# Patient Record
Sex: FEMALE | Race: WHITE | NOT HISPANIC OR LATINO | Employment: PART TIME | URBAN - METROPOLITAN AREA
[De-identification: names, ages, dates, MRNs, and addresses within clinical notes are randomized per-mention and may not be internally consistent; named-entity substitution may affect disease eponyms.]

---

## 2017-04-03 ENCOUNTER — ALLSCRIPTS OFFICE VISIT (OUTPATIENT)
Dept: OTHER | Facility: OTHER | Age: 57
End: 2017-04-03

## 2017-05-01 ENCOUNTER — TRANSCRIBE ORDERS (OUTPATIENT)
Dept: LAB | Facility: HOSPITAL | Age: 57
End: 2017-05-01

## 2017-05-01 ENCOUNTER — APPOINTMENT (OUTPATIENT)
Dept: LAB | Facility: HOSPITAL | Age: 57
End: 2017-05-01
Attending: INTERNAL MEDICINE
Payer: COMMERCIAL

## 2017-05-01 DIAGNOSIS — K58.9 IRRITABLE BOWEL SYNDROME, UNSPECIFIED TYPE: Primary | ICD-10-CM

## 2017-05-01 DIAGNOSIS — K58.9 IRRITABLE BOWEL SYNDROME, UNSPECIFIED TYPE: ICD-10-CM

## 2017-05-01 LAB
ALBUMIN SERPL BCP-MCNC: 4 G/DL (ref 3.5–5)
ALP SERPL-CCNC: 110 U/L (ref 46–116)
ALT SERPL W P-5'-P-CCNC: 35 U/L (ref 12–78)
ANION GAP SERPL CALCULATED.3IONS-SCNC: 5 MMOL/L (ref 4–13)
AST SERPL W P-5'-P-CCNC: 18 U/L (ref 5–45)
BILIRUB SERPL-MCNC: 0.35 MG/DL (ref 0.2–1)
BUN SERPL-MCNC: 18 MG/DL (ref 5–25)
CALCIUM SERPL-MCNC: 9.2 MG/DL (ref 8.3–10.1)
CHLORIDE SERPL-SCNC: 106 MMOL/L (ref 100–108)
CO2 SERPL-SCNC: 31 MMOL/L (ref 21–32)
CREAT SERPL-MCNC: 0.64 MG/DL (ref 0.6–1.3)
GFR SERPL CREATININE-BSD FRML MDRD: >60 ML/MIN/1.73SQ M
GLUCOSE SERPL-MCNC: 90 MG/DL (ref 65–140)
POTASSIUM SERPL-SCNC: 4.3 MMOL/L (ref 3.5–5.3)
PROT SERPL-MCNC: 7.4 G/DL (ref 6.4–8.2)
SODIUM SERPL-SCNC: 142 MMOL/L (ref 136–145)

## 2017-05-01 PROCEDURE — 36415 COLL VENOUS BLD VENIPUNCTURE: CPT

## 2017-05-01 PROCEDURE — 80053 COMPREHEN METABOLIC PANEL: CPT

## 2017-12-11 ENCOUNTER — APPOINTMENT (OUTPATIENT)
Dept: LAB | Facility: CLINIC | Age: 57
End: 2017-12-11
Payer: COMMERCIAL

## 2017-12-11 ENCOUNTER — TRANSCRIBE ORDERS (OUTPATIENT)
Dept: LAB | Facility: CLINIC | Age: 57
End: 2017-12-11

## 2017-12-11 DIAGNOSIS — Z13.9 SCREENING FOR CONDITION: ICD-10-CM

## 2017-12-11 DIAGNOSIS — E55.9 AVITAMINOSIS D: Primary | ICD-10-CM

## 2017-12-11 LAB
25(OH)D3 SERPL-MCNC: 31.5 NG/ML (ref 30–100)
ALBUMIN SERPL BCP-MCNC: 3.7 G/DL (ref 3.5–5)
ALP SERPL-CCNC: 105 U/L (ref 46–116)
ALT SERPL W P-5'-P-CCNC: 33 U/L (ref 12–78)
ANION GAP SERPL CALCULATED.3IONS-SCNC: 4 MMOL/L (ref 4–13)
AST SERPL W P-5'-P-CCNC: 20 U/L (ref 5–45)
BILIRUB SERPL-MCNC: 0.43 MG/DL (ref 0.2–1)
BUN SERPL-MCNC: 20 MG/DL (ref 5–25)
CALCIUM SERPL-MCNC: 9.2 MG/DL (ref 8.3–10.1)
CHLORIDE SERPL-SCNC: 107 MMOL/L (ref 100–108)
CHOLEST SERPL-MCNC: 220 MG/DL (ref 50–200)
CO2 SERPL-SCNC: 30 MMOL/L (ref 21–32)
CREAT SERPL-MCNC: 0.69 MG/DL (ref 0.6–1.3)
ERYTHROCYTE [DISTWIDTH] IN BLOOD BY AUTOMATED COUNT: 12.7 % (ref 11.6–15.1)
GFR SERPL CREATININE-BSD FRML MDRD: 98 ML/MIN/1.73SQ M
GLUCOSE P FAST SERPL-MCNC: 92 MG/DL (ref 65–99)
HCT VFR BLD AUTO: 44.9 % (ref 34.8–46.1)
HDLC SERPL-MCNC: 76 MG/DL (ref 40–60)
HGB BLD-MCNC: 14.9 G/DL (ref 11.5–15.4)
LDLC SERPL CALC-MCNC: 122 MG/DL (ref 0–100)
MCH RBC QN AUTO: 28.9 PG (ref 26.8–34.3)
MCHC RBC AUTO-ENTMCNC: 33.2 G/DL (ref 31.4–37.4)
MCV RBC AUTO: 87 FL (ref 82–98)
PLATELET # BLD AUTO: 233 THOUSANDS/UL (ref 149–390)
PMV BLD AUTO: 10.8 FL (ref 8.9–12.7)
POTASSIUM SERPL-SCNC: 4.4 MMOL/L (ref 3.5–5.3)
PROT SERPL-MCNC: 7.2 G/DL (ref 6.4–8.2)
RBC # BLD AUTO: 5.15 MILLION/UL (ref 3.81–5.12)
SODIUM SERPL-SCNC: 141 MMOL/L (ref 136–145)
TRIGL SERPL-MCNC: 110 MG/DL
WBC # BLD AUTO: 4.59 THOUSAND/UL (ref 4.31–10.16)

## 2017-12-11 PROCEDURE — 80061 LIPID PANEL: CPT

## 2017-12-11 PROCEDURE — 82306 VITAMIN D 25 HYDROXY: CPT

## 2017-12-11 PROCEDURE — 85027 COMPLETE CBC AUTOMATED: CPT

## 2017-12-11 PROCEDURE — 80053 COMPREHEN METABOLIC PANEL: CPT

## 2017-12-11 PROCEDURE — 36415 COLL VENOUS BLD VENIPUNCTURE: CPT

## 2017-12-30 DIAGNOSIS — Z12.31 ENCOUNTER FOR SCREENING MAMMOGRAM FOR MALIGNANT NEOPLASM OF BREAST: ICD-10-CM

## 2018-01-13 VITALS
DIASTOLIC BLOOD PRESSURE: 76 MMHG | WEIGHT: 125 LBS | HEIGHT: 68 IN | SYSTOLIC BLOOD PRESSURE: 112 MMHG | BODY MASS INDEX: 18.94 KG/M2

## 2018-03-29 ENCOUNTER — HOSPITAL ENCOUNTER (OUTPATIENT)
Dept: RADIOLOGY | Age: 58
Discharge: HOME | End: 2018-03-29
Payer: COMMERCIAL

## 2018-03-29 DIAGNOSIS — Z12.31 ENCOUNTER FOR SCREENING MAMMOGRAM FOR MALIGNANT NEOPLASM OF BREAST: ICD-10-CM

## 2018-03-29 PROCEDURE — 77063 BREAST TOMOSYNTHESIS BI: CPT

## 2018-03-29 PROCEDURE — 77067 SCR MAMMO BI INCL CAD: CPT

## 2018-04-16 ENCOUNTER — ANNUAL EXAM (OUTPATIENT)
Dept: OBGYN CLINIC | Facility: CLINIC | Age: 58
End: 2018-04-16
Payer: COMMERCIAL

## 2018-04-16 VITALS
DIASTOLIC BLOOD PRESSURE: 66 MMHG | BODY MASS INDEX: 20.75 KG/M2 | WEIGHT: 132.2 LBS | HEIGHT: 67 IN | SYSTOLIC BLOOD PRESSURE: 110 MMHG

## 2018-04-16 DIAGNOSIS — Z12.39 BREAST CANCER SCREENING: ICD-10-CM

## 2018-04-16 DIAGNOSIS — Z01.419 WOMEN'S ANNUAL ROUTINE GYNECOLOGICAL EXAMINATION: Primary | ICD-10-CM

## 2018-04-16 PROCEDURE — S0612 ANNUAL GYNECOLOGICAL EXAMINA: HCPCS | Performed by: OBSTETRICS & GYNECOLOGY

## 2018-04-16 RX ORDER — DICYCLOMINE HYDROCHLORIDE 10 MG/1
CAPSULE ORAL
COMMUNITY
Start: 2018-04-04 | End: 2018-09-27 | Stop reason: SDUPTHER

## 2018-04-16 NOTE — PROGRESS NOTES
This is a 53-year-old white female, she is a  4 para 3 prior vaginal deliveries  She is now in menopause  She does have history of irritable bowel syndrome which is under care physician  She has taken Bentyl for treatment  She denies any other serious medical problem this time  Menopause symptoms are under control except for vaginal dryness  This is getting better  She denies any major  GI complaint  Colonoscopies are up-to-date  There are no new major family illnesses report this time  Patient denies any problem with anxiety or depression  Review of systems noncontributory      Surgical history is negative      Medical problems consistent with IBS      Family history noncontributory      Social history negative tobacco positive social alcohol      Physical exam well-developed well-nourished thin white female acute distress heart and lung exam normal limits the breast exam normal limits the abdomen is soft there are no masses pelvic exam the external genitalia normal limits vagina is clean the uterus is mid position normal size is no cervical motion tenderness adnexa clear bilaterally  A Pap smear was not performed because of prior history being HPV negative in the year 2016  Impression stable menopausal exam   Continue getting yearly mammograms  The patient already taking calcium and vitamin-D  She return to office in 1 year

## 2018-04-16 NOTE — PATIENT INSTRUCTIONS
The patient was informed of a stable gyn examination  No Pap smears performed because of prior history being HPV negative in the year 2016  She should continue to get yearly mammograms  She should continue to return my office in 1 year

## 2018-08-22 ENCOUNTER — TELEPHONE (OUTPATIENT)
Dept: GASTROENTEROLOGY | Facility: MEDICAL CENTER | Age: 58
End: 2018-08-22

## 2018-08-22 DIAGNOSIS — K52.9 INFLAMMATORY BOWEL DISEASE: Primary | ICD-10-CM

## 2018-08-22 NOTE — TELEPHONE ENCOUNTER
----- Message from Melly Us DO sent at 8/22/2018 10:29 AM EDT -----  Please offer her earlier appt if she would like  ----- Message -----  From: Dawood Smith  Sent: 8/22/2018  10:05 AM  To: Melly Us DO    Good morning Dr Abner Louise,    Call center already scheduled this patient with you on 11/6/2018 at 10am at the [facility] office  Do you want to us to offer her an earlier appt or leave it the way it is? Thank you   Malina      ----- Message -----  From: Melly Us DO  Sent: 8/22/2018   9:23 AM  To: [facility]    Please schedule pt new pt appt with me  Ok to [employer] she was referred by a cardiologist who works with me      Thanks,  Beverly Kaufman

## 2018-09-12 ENCOUNTER — OFFICE VISIT (OUTPATIENT)
Dept: GASTROENTEROLOGY | Facility: CLINIC | Age: 58
End: 2018-09-12
Payer: COMMERCIAL

## 2018-09-12 VITALS
WEIGHT: 129.6 LBS | HEART RATE: 53 BPM | BODY MASS INDEX: 20.3 KG/M2 | SYSTOLIC BLOOD PRESSURE: 124 MMHG | DIASTOLIC BLOOD PRESSURE: 66 MMHG | TEMPERATURE: 98.4 F

## 2018-09-12 DIAGNOSIS — K58.2 IRRITABLE BOWEL SYNDROME WITH BOTH CONSTIPATION AND DIARRHEA: ICD-10-CM

## 2018-09-12 DIAGNOSIS — Z12.11 SCREENING FOR COLON CANCER: Primary | ICD-10-CM

## 2018-09-12 PROCEDURE — 99244 OFF/OP CNSLTJ NEW/EST MOD 40: CPT | Performed by: INTERNAL MEDICINE

## 2018-09-12 RX ORDER — LOPERAMIDE HYDROCHLORIDE 2 MG/1
2 TABLET ORAL 4 TIMES DAILY PRN
Qty: 120 TABLET | Refills: 5 | Status: SHIPPED | OUTPATIENT
Start: 2018-09-12 | End: 2020-03-20 | Stop reason: SDUPTHER

## 2018-09-12 RX ORDER — PEG-3350, SODIUM SULFATE, SODIUM CHLORIDE, POTASSIUM CHLORIDE, SODIUM ASCORBATE AND ASCORBIC ACID 7.5-2.691G
2000 KIT ORAL ONCE
Qty: 2000 ML | Refills: 0 | Status: SHIPPED | OUTPATIENT
Start: 2018-09-12 | End: 2019-05-22

## 2018-09-12 NOTE — PROGRESS NOTES
[facility] - Outpatient Consultation  Zahra Grewal 66 y o  female MRN: [id number]  Encounter: [id number]          ASSESSMENT AND PLAN:      1  Irritable bowel syndrome  Long standing IBS, patient follows with Dr Frnak Chopra as OP but wanted a second opinion, has tried low FODMAP and gluten free diet with mild improvement, bentyl helps with pain, has tried imodium to help with diarrhea but is concerned about getting constipated  Currently the patient having 3-6 BM a day, alternating between decreased consistency and normal consistency, no weight loss  Will start imodium at the beginning of the day along with xifaxan for 2 weeks  Will have a f/u appointment in 6 months  - Ambulatory referral to Gastroenterology    2  Colorectal cancer screening  - Patient is 66 y o , has been screened for colon cancer in the past,last colonoscopy was 5 years ago with Dr Frank Chopra, denies any family history of GI malignancy or IBD, no alarm symptoms at this point except for abnormal bowel movements  - Other options for colorectal cancer screening were discussed with the patient, will perform colonoscopy, risk and benefits of the procedure were discussed with the patient including but not limited to bleeding, infection, perforation and missing an adenoma   ______________________________________________________________________    HPI:  67 yo female patient with no significant past medical history except for chronic IBS, patient states she suffers from IBS since she was young, she complains about abdominal pain in the meso and hypogastrium, she has associated bloating and mostly diarrhea, associated to diarrhea she has had episodes of fecal incontinence, she has tried imodium in the past but she gets constipated after using imodium, bentyl usually helps with pain but not with diarrhea  Patient had 3 vaginal deliveries in the past and had episiotomy during all procedures   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      REVIEW OF SYSTEMS:    CONSTITUTIONAL: Denies any fever, chills, rigors, and weight loss  HEENT: No earache or tinnitus  Denies hearing loss or visual disturbances  CARDIOVASCULAR: No chest pain or palpitations  RESPIRATORY: Denies any cough, hemoptysis, shortness of breath or dyspnea on exertion  GASTROINTESTINAL: As noted in the History of Present Illness  GENITOURINARY: No problems with urination  Denies any hematuria or dysuria  NEUROLOGIC: No dizziness or vertigo, denies headaches  MUSCULOSKELETAL: Denies any muscle or joint pain  SKIN: Denies skin rashes or itching  ENDOCRINE: Denies excessive thirst  Denies intolerance to heat or cold  PSYCHOSOCIAL: Denies depression or anxiety  Denies any recent memory loss  Historical Information   History reviewed  No pertinent past medical history  Past Surgical History:   Procedure Laterality Date    COLONOSCOPY      every 5 years    UPPER GASTROINTESTINAL ENDOSCOPY       Social History   History   Alcohol Use    Yes     Comment: occasional     History   Drug Use No     History   Smoking Status    Never Smoker   Smokeless Tobacco    Never Used     Family History   Problem Relation Age of Onset    Heart disease Father     Other Father         Myocardial Ischemia    Coronary artery disease Family     Emphysema Other     Heart attack Other     Heart disease Other     Hyperlipidemia Other        Meds/Allergies       Current Outpatient Prescriptions:     dicyclomine (BENTYL) 10 mg capsule    Allergies   Allergen Reactions    Eggs Or Egg-Derived Products     Erythromycin     Gluten Meal     Milk-Related Compounds            Objective     Blood pressure 124/66, pulse (!) 53, temperature 98 4 °F (36 9 °C), temperature source Tympanic, weight 58 8 kg (129 lb 9 6 oz)  Body mass index is 20 3 kg/m²          PHYSICAL EXAM:      General Appearance:   Alert, cooperative, no distress   HEENT:   Normocephalic, atraumatic, anicteric      Neck:  Supple, symmetrical, trachea midline   Lungs:   Clear to auscultation bilaterally; no rales, rhonchi or wheezing; respirations unlabored    Heart[de-identified]   Regular rate and rhythm; no murmur, rub, or gallop  Abdomen:   Soft, non-tender, non-distended; normal bowel sounds; no masses, no organomegaly    Genitalia:   Deferred    Rectal:   Deferred    Extremities:  No cyanosis, clubbing or edema    Pulses:  2+ and symmetric    Skin:  No jaundice, rashes, or lesions    Lymph nodes:  No palpable cervical lymphadenopathy        Lab Results:   No visits with results within 1 Day(s) from this visit  Latest known visit with results is:   Transcribe Orders on 12/11/2017   Component Date Value    WBC 12/11/2017 4 59     RBC 12/11/2017 5 15*    Hemoglobin 12/11/2017 14 9     Hematocrit 12/11/2017 44 9     MCV 12/11/2017 87     MCH 12/11/2017 28 9     MCHC 12/11/2017 33 2     RDW 12/11/2017 12 7     Platelets 38/60/9708 233     MPV 12/11/2017 10 8     Sodium 12/11/2017 141     Potassium 12/11/2017 4 4     Chloride 12/11/2017 107     CO2 12/11/2017 30     ANION GAP 12/11/2017 4     BUN 12/11/2017 20     Creatinine 12/11/2017 0 69     Glucose, Fasting 12/11/2017 92     Calcium 12/11/2017 9 2     AST 12/11/2017 20     ALT 12/11/2017 33     Alkaline Phosphatase 12/11/2017 105     Total Protein 12/11/2017 7 2     Albumin 12/11/2017 3 7     Total Bilirubin 12/11/2017 0 43     eGFR 12/11/2017 98     Cholesterol 12/11/2017 220*    Triglycerides 12/11/2017 110     HDL, Direct 12/11/2017 76*    LDL Calculated 12/11/2017 122*    Vit D, 25-Hydroxy 12/11/2017 31 5          Radiology Results:   No results found

## 2018-09-12 NOTE — LETTER
September 12, 2018     [place] [address]    Patient: Fernie Rome   YOB: 1960   Date of Visit: 9/12/2018       Dear Dr Dinesh Espinosa: Thank you for referring Karen Ramirez to me for evaluation  Below are my notes for this consultation  If you have questions, please do not hesitate to call me  I look forward to following your patient along with you  Sincerely,        Kaley Pod, DO        CC: No Recipients  Miranda Farrar MD  9/12/2018 12:28 PM  [address] [facility] - Outpatient Consultation  Fernie Rome 66 y o  female MRN: [id number]  Encounter: [id number]          ASSESSMENT AND PLAN:      1  Irritable bowel syndrome  Long standing IBS, patient follows with Dr Candelaria Lindquist as OP but wanted a second opinion, has tried low FODMAP and gluten free diet with mild improvement, bentyl helps with pain, has tried imodium to help with diarrhea but is concerned about getting constipated  Currently the patient having 3-6 BM a day, alternating between decreased consistency and normal consistency, no weight loss  Will start imodium at the beginning of the day along with xifaxan for 2 weeks  Will have a f/u appointment in 6 months  - Ambulatory referral to Gastroenterology    2  Colorectal cancer screening  - Patient is 66 y o , has been screened for colon cancer in the past,last colonoscopy was 5 years ago with Dr Candelaria Lindquist, denies any family history of GI malignancy or IBD, no alarm symptoms at this point except for abnormal bowel movements    - Other options for colorectal cancer screening were discussed with the patient, will perform colonoscopy, risk and benefits of the procedure were discussed with the patient including but not limited to bleeding, infection, perforation and missing an adenoma   ______________________________________________________________________    HPI:  65 yo female patient with no significant past medical history except for chronic IBS, patient states she suffers from IBS since she was young, she complains about abdominal pain in the meso and hypogastrium, she has associated bloating and mostly diarrhea, associated to diarrhea she has had episodes of fecal incontinence, she has tried imodium in the past but she gets constipated after using imodium, bentyl usually helps with pain but not with diarrhea  Patient had 3 vaginal deliveries in the past and had episiotomy during all procedures  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      REVIEW OF SYSTEMS:    CONSTITUTIONAL: Denies any fever, chills, rigors, and weight loss  HEENT: No earache or tinnitus  Denies hearing loss or visual disturbances  CARDIOVASCULAR: No chest pain or palpitations  RESPIRATORY: Denies any cough, hemoptysis, shortness of breath or dyspnea on exertion  GASTROINTESTINAL: As noted in the History of Present Illness  GENITOURINARY: No problems with urination  Denies any hematuria or dysuria  NEUROLOGIC: No dizziness or vertigo, denies headaches  MUSCULOSKELETAL: Denies any muscle or joint pain  SKIN: Denies skin rashes or itching  ENDOCRINE: Denies excessive thirst  Denies intolerance to heat or cold  PSYCHOSOCIAL: Denies depression or anxiety  Denies any recent memory loss  Historical Information   History reviewed  No pertinent past medical history    Past Surgical History:   Procedure Laterality Date    COLONOSCOPY      every 5 years    UPPER GASTROINTESTINAL ENDOSCOPY       Social History   History   Alcohol Use    Yes     Comment: occasional     History   Drug Use No     History   Smoking Status    Never Smoker   Smokeless Tobacco    Never Used     Family History   Problem Relation Age of Onset    Heart disease Father     Other Father         Myocardial Ischemia    Coronary artery disease Family     Emphysema Other     Heart attack Other     Heart disease Other     Hyperlipidemia Other        Meds/Allergies       Current Outpatient Prescriptions:     dicyclomine (BENTYL) 10 mg capsule    Allergies   Allergen Reactions    Eggs Or Egg-Derived Products     Erythromycin     Gluten Meal     Milk-Related Compounds            Objective     Blood pressure 124/66, pulse (!) 53, temperature 98 4 °F (36 9 °C), temperature source Tympanic, weight 58 8 kg (129 lb 9 6 oz)  Body mass index is 20 3 kg/m²  PHYSICAL EXAM:      General Appearance:   Alert, cooperative, no distress   HEENT:   Normocephalic, atraumatic, anicteric      Neck:  Supple, symmetrical, trachea midline   Lungs:   Clear to auscultation bilaterally; no rales, rhonchi or wheezing; respirations unlabored    Heart[de-identified]   Regular rate and rhythm; no murmur, rub, or gallop  Abdomen:   Soft, non-tender, non-distended; normal bowel sounds; no masses, no organomegaly    Genitalia:   Deferred    Rectal:   Deferred    Extremities:  No cyanosis, clubbing or edema    Pulses:  2+ and symmetric    Skin:  No jaundice, rashes, or lesions    Lymph nodes:  No palpable cervical lymphadenopathy        Lab Results:   No visits with results within 1 Day(s) from this visit     Latest known visit with results is:   Transcribe Orders on 12/11/2017   Component Date Value    WBC 12/11/2017 4 59     RBC 12/11/2017 5 15*    Hemoglobin 12/11/2017 14 9     Hematocrit 12/11/2017 44 9     MCV 12/11/2017 87     MCH 12/11/2017 28 9     MCHC 12/11/2017 33 2     RDW 12/11/2017 12 7     Platelets 81/43/1202 233     MPV 12/11/2017 10 8     Sodium 12/11/2017 141     Potassium 12/11/2017 4 4     Chloride 12/11/2017 107     CO2 12/11/2017 30     ANION GAP 12/11/2017 4     BUN 12/11/2017 20     Creatinine 12/11/2017 0 69     Glucose, Fasting 12/11/2017 92     Calcium 12/11/2017 9 2     AST 12/11/2017 20     ALT 12/11/2017 33     Alkaline Phosphatase 12/11/2017 105     Total Protein 12/11/2017 7 2     Albumin 12/11/2017 3 7     Total Bilirubin 12/11/2017 0 43     eGFR 12/11/2017 98     Cholesterol 12/11/2017 220*    Triglycerides 12/11/2017 110     HDL, Direct 12/11/2017 76*    LDL Calculated 12/11/2017 122*    Vit D, 25-Hydroxy 12/11/2017 31 5          Radiology Results:   No results found

## 2018-09-12 NOTE — PATIENT INSTRUCTIONS
COLON scheduled with Dr Velez at [facility] on 10/18/2018  Bandar gave pt verbal instructions/paper work given  Prep Moviprep

## 2018-09-18 ENCOUNTER — TELEPHONE (OUTPATIENT)
Dept: GASTROENTEROLOGY | Facility: AMBULARY SURGERY CENTER | Age: 58
End: 2018-09-18

## 2018-09-18 NOTE — TELEPHONE ENCOUNTER
I spoke to Laurita Kerr and explained that Abi Hand is a 2 week course  Patients have relief anywhere from 6 weeks to 6 months  If she has good results with Xifaxan we can repeat and other course in the future  She inquired about manometry testing, which Dr Dianna Osler mentioned to her in the office  I presume we recommended colonoscopy first and if that is normal, then manometry testing? Also, she asked if you could review her previous notes from Dr Beola Severin office  These are located under the media tab

## 2018-09-18 NOTE — TELEPHONE ENCOUNTER
Patient was called and explained that as discussed at the end of the visit will proceed with colonoscopy only, manometry was discussed as an option initially with the patient and with Dr Jacqui Fair but discarded at the end of the visit

## 2018-09-18 NOTE — TELEPHONE ENCOUNTER
Dr Velez's pt called wanting to know once she finishes her dosage of Xifaxan does she have to continue taking that medication or will she be completed dara  Pt also wants to know if the doctor wants her to have any lab works done, and if the doctor has went over her medical records that was sent from previous doctors office  Please call pt to discuss and advise on her questions   Pt can be reached at [phone]

## 2018-09-27 DIAGNOSIS — K58.0 IRRITABLE BOWEL SYNDROME WITH DIARRHEA: Primary | ICD-10-CM

## 2018-09-27 RX ORDER — DICYCLOMINE HYDROCHLORIDE 10 MG/1
10 CAPSULE ORAL 4 TIMES DAILY PRN
Qty: 120 CAPSULE | Refills: 3 | Status: SHIPPED | OUTPATIENT
Start: 2018-09-27 | End: 2020-03-20 | Stop reason: SDUPTHER

## 2018-10-05 ENCOUNTER — TELEPHONE (OUTPATIENT)
Dept: GASTROENTEROLOGY | Facility: AMBULARY SURGERY CENTER | Age: 58
End: 2018-10-05

## 2018-10-05 NOTE — TELEPHONE ENCOUNTER
Spoke with pt  Possibly related to med vs IBS  She is almost finished with 2 week coarse & I recommended finishing & calling us if sxs worsened or didn't improve    Zully Claudia

## 2018-10-05 NOTE — TELEPHONE ENCOUNTER
Dr Velez's pt called stating that she is still feeling bloated and still having gas  Pt stated that she is not sure if the Xifaxan is giving her those symptoms   Pt will like a call back to discuss further [phone]

## 2018-10-10 NOTE — TELEPHONE ENCOUNTER
Spoke with pt  States she was having a lot of cramping & diarrhea over the weekend but is now feeling better  She has a colonoscopy scheduled this month  Recommend office f/u after to discuss results & sxs  Please call pt to make a f/u appt      Darryl Welsh

## 2018-10-17 ENCOUNTER — ANESTHESIA EVENT (OUTPATIENT)
Dept: GASTROENTEROLOGY | Facility: MEDICAL CENTER | Age: 58
End: 2018-10-17
Payer: COMMERCIAL

## 2018-10-18 ENCOUNTER — ANESTHESIA (OUTPATIENT)
Dept: GASTROENTEROLOGY | Facility: MEDICAL CENTER | Age: 58
End: 2018-10-18
Payer: COMMERCIAL

## 2018-10-18 ENCOUNTER — HOSPITAL ENCOUNTER (OUTPATIENT)
Facility: MEDICAL CENTER | Age: 58
Setting detail: OUTPATIENT SURGERY
Discharge: HOME | End: 2018-10-18
Attending: INTERNAL MEDICINE | Admitting: INTERNAL MEDICINE
Payer: COMMERCIAL

## 2018-10-18 VITALS
SYSTOLIC BLOOD PRESSURE: 128 MMHG | WEIGHT: 129 LBS | HEART RATE: 50 BPM | BODY MASS INDEX: 20.25 KG/M2 | HEIGHT: 67 IN | OXYGEN SATURATION: 100 % | DIASTOLIC BLOOD PRESSURE: 63 MMHG | TEMPERATURE: 98.2 F | RESPIRATION RATE: 16 BRPM

## 2018-10-18 PROCEDURE — G0121 COLON CA SCRN NOT HI RSK IND: HCPCS | Performed by: INTERNAL MEDICINE

## 2018-10-18 RX ORDER — PROPOFOL 10 MG/ML
INJECTION, EMULSION INTRAVENOUS AS NEEDED
Status: DISCONTINUED | OUTPATIENT
Start: 2018-10-18 | End: 2018-10-18 | Stop reason: SURG

## 2018-10-18 RX ORDER — SODIUM CHLORIDE 9 MG/ML
125 INJECTION, SOLUTION INTRAVENOUS CONTINUOUS
Status: DISCONTINUED | OUTPATIENT
Start: 2018-10-18 | End: 2018-10-18 | Stop reason: HOSPADM

## 2018-10-18 RX ORDER — LIDOCAINE HYDROCHLORIDE 20 MG/ML
INJECTION, SOLUTION EPIDURAL; INFILTRATION; INTRACAUDAL; PERINEURAL AS NEEDED
Status: DISCONTINUED | OUTPATIENT
Start: 2018-10-18 | End: 2018-10-18 | Stop reason: SURG

## 2018-10-18 RX ADMIN — PROPOFOL 30 MG: 10 INJECTION, EMULSION INTRAVENOUS at 10:32

## 2018-10-18 RX ADMIN — PROPOFOL 120 MG: 10 INJECTION, EMULSION INTRAVENOUS at 10:26

## 2018-10-18 RX ADMIN — SODIUM CHLORIDE 125 ML/HR: 0.9 INJECTION, SOLUTION INTRAVENOUS at 10:21

## 2018-10-18 RX ADMIN — PROPOFOL 30 MG: 10 INJECTION, EMULSION INTRAVENOUS at 10:42

## 2018-10-18 RX ADMIN — PROPOFOL 30 MG: 10 INJECTION, EMULSION INTRAVENOUS at 10:34

## 2018-10-18 RX ADMIN — LIDOCAINE HYDROCHLORIDE 3 ML: 20 INJECTION, SOLUTION EPIDURAL; INFILTRATION; INTRACAUDAL; PERINEURAL at 10:26

## 2018-10-18 RX ADMIN — SODIUM CHLORIDE 125 ML/HR: 0.9 INJECTION, SOLUTION INTRAVENOUS at 09:24

## 2018-10-18 RX ADMIN — PROPOFOL 20 MG: 10 INJECTION, EMULSION INTRAVENOUS at 10:35

## 2018-10-18 RX ADMIN — PROPOFOL 30 MG: 10 INJECTION, EMULSION INTRAVENOUS at 10:29

## 2018-10-18 NOTE — OP NOTE
OPERATIVE REPORT  PATIENT NAME: Pavel Giles    :  1960  MRN: [id number]  Pt Location: [facility] GI ROOM 01    SURGERY DATE: 10/18/2018    Surgeon(s) and Role:     Flor Waldrop, DO - Primary    Preop Diagnosis:  Screening for colon cancer [Z12 11]  Irritable bowel syndrome with both constipation and diarrhea [K58 2]    Post-Op Diagnosis Codes:     * Screening for colon cancer [Z12 11]     * Irritable bowel syndrome with both constipation and diarrhea [K58 2]    Procedure(s) (LRB):  COLONOSCOPY (N/A)    Specimen(s):  * No specimens in log *    Estimated Blood Loss:   Minimal    Drains:       Anesthesia Type:   IV Sedation with Anesthesia    Operative Indications:  Screening for colon cancer [Z12 11]  Irritable bowel syndrome with both constipation and diarrhea [K58 2]      Operative Findings:    Colonoscopy Procedure Note    Procedure: Colonoscopy    Sedation: Monitored anesthesia care, check anesthesia records      ASA Class: 2    INDICATIONS:  Colon cancer screening    POST-OP DIAGNOSIS: See the impression below    Procedure Details     Prior colonoscopy: 5 years ago  Informed consent was obtained for the procedure, including sedation  Risks of perforation, hemorrhage, adverse drug reaction and aspiration were discussed  The patient was placed in the left lateral decubitus position  Based on the pre-procedure assessment, including review of the patient's medical history, medications, allergies, and review of systems, she had been deemed to be an appropriate candidate for conscious sedation; she was therefore sedated with the medications listed below  The patient was monitored continuously with telemetry, pulse oximetry, blood pressure monitoring, and direct observations  A rectal examination was performed  The colonoscope was inserted into the rectum and advanced under direct vision to the cecum, which was identified by the ileocecal valve and appendiceal orifice    The quality of the colonic preparation was good  A careful inspection was made as the colonoscope was withdrawn, including a retroflexed view of the rectum; findings and interventions are described below  Findings:  Normal colonoscopy on direct and retroflexed views  Complications: None; patient tolerated the procedure well  Impression:    Normal colonoscopy on direct and retroflexed views  Recommendations:  Repeat colonoscopy in 10 years or sooner if clinically indicated        SIGNATURE: Basil Rodriguez DO  DATE: October 18, 2018  TIME: 11:30 AM

## 2018-10-18 NOTE — H&P
History and Physical - [facility]  Jose Hinds 66 y o  female MRN: [id number]                  HPI: Jose Hinds is a 66y o  year old female who presents for colon cancer screening  REVIEW OF SYSTEMS: Per the HPI, and otherwise unremarkable  Historical Information   Past Medical History:   Diagnosis Date    Chiari syndrome (HCC)     IBS (irritable bowel syndrome)      Past Surgical History:   Procedure Laterality Date    COLONOSCOPY      every 5 years    KNEE ARTHROSCOPY Left     NASAL SEPTUM SURGERY      UPPER GASTROINTESTINAL ENDOSCOPY       Social History   History   Alcohol Use    Yes     Comment: occasional     History   Drug Use No     History   Smoking Status    Never Smoker   Smokeless Tobacco    Never Used     Family History   Problem Relation Age of Onset    Heart disease Father     Other Father         Myocardial Ischemia    Coronary artery disease Family     Emphysema Other     Heart attack Other     Heart disease Other     Hyperlipidemia Other        Meds/Allergies     Prescriptions Prior to Admission   Medication    dicyclomine (BENTYL) 10 mg capsule    loperamide (IMODIUM A-D) 2 MG tablet    PEG 3350-KCl-NaCl-NaSulf-Na Asc-C (MOVIPREP) 100 g       Allergies   Allergen Reactions    Eggs Or Egg-Derived Products     Erythromycin     Gluten Meal     Milk-Related Compounds        Objective     Blood pressure 137/65, pulse (!) 51, temperature 98 2 °F (36 8 °C), temperature source Temporal, resp  rate 17, height 5' 7" (1 702 m), weight 58 5 kg (129 lb), SpO2 100 %  PHYSICAL EXAM    Gen: NAD  CV: RRR  CHEST: Clear  ABD: soft, NT/ND  EXT: no edema      ASSESSMENT/PLAN:  This is a 66y o  year old female here for colonoscopy, and she is stable and optimized for her procedure

## 2018-10-18 NOTE — DISCHARGE INSTRUCTIONS
Colonoscopy   WHAT YOU NEED TO KNOW:   A colonoscopy is a procedure to examine the inside of your colon (intestine) with a scope  Polyps or tissue growths may have been removed during your colonoscopy  It is normal to feel bloated and to have some abdominal discomfort  You should be passing gas  If you have hemorrhoids or you had polyps removed, you may have a small amount of bleeding  DISCHARGE INSTRUCTIONS:   Seek care immediately if:   · You have a large amount of bright red blood in your bowel movements  · Your abdomen is hard and firm and you have severe pain  · You have sudden trouble breathing  Contact your healthcare provider if:   · You develop a rash or hives  · You have a fever within 24 hours of your procedure  · You have not had a bowel movement for 3 days after your procedure  · You have questions or concerns about your condition or care  Activity:   · Do not lift, strain, or run  for 3 days after your procedure  · Rest after your procedure  You have been given medicine to relax you  Do not  drive or make important decisions until the day after your procedure  Return to your normal activity as directed  · Relieve gas and discomfort from bloating  by lying on your right side with a heating pad on your abdomen  You may need to take short walks to help the gas move out  Eat small meals until bloating is relieved  If you had polyps removed: For 7 days after your procedure:  · Do not  take aspirin  · Do not  go on long car rides  Help prevent constipation:   · Eat a variety of healthy foods  Healthy foods include fruit, vegetables, whole-grain breads, low-fat dairy products, beans, lean meat, and fish  Ask if you need to be on a special diet  Your healthcare provider may recommend that you eat high-fiber foods such as cooked beans  Fiber helps you have regular bowel movements  · Drink liquids as directed    Adults should drink between 9 and 13 eight-ounce cups of liquid every day  Ask what amount is best for you  For most people, good liquids to drink are water, juice, and milk  · Exercise as directed  Talk to your healthcare provider about the best exercise plan for you  Exercise can help prevent constipation, decrease your blood pressure and improve your health  Follow up with your healthcare provider as directed:  Write down your questions so you remember to ask them during your visits  © 2017 [employer] Information is for End User's use only and may not be sold, redistributed or otherwise used for commercial purposes  All illustrations and images included in CareNotes® are the copyrighted property of [employer]  or [employer]  The above information is an  only  It is not intended as medical advice for individual conditions or treatments  Talk to your doctor, nurse or pharmacist before following any medical regimen to see if it is safe and effective for you

## 2018-10-18 NOTE — DISCHARGE INSTR - AVS FIRST PAGE
OPERATIVE REPORT  PATIENT NAME: Jaz Hilliard    :  1960  MRN: [id number]  Pt Location: [facility] GI ROOM 01    SURGERY DATE: 10/18/2018    Surgeon(s) and Role:     Jamey Jung DO - Primary    Preop Diagnosis:  Screening for colon cancer [Z12 11]  Irritable bowel syndrome with both constipation and diarrhea [K58 2]    Post-Op Diagnosis Codes:     * Screening for colon cancer [Z12 11]     * Irritable bowel syndrome with both constipation and diarrhea [K58 2]    Procedure(s) (LRB):  COLONOSCOPY (N/A)    Specimen(s):  * No specimens in log *    Estimated Blood Loss:   Minimal    Drains:       Anesthesia Type:   IV Sedation with Anesthesia    Operative Indications:  Screening for colon cancer [Z12 11]  Irritable bowel syndrome with both constipation and diarrhea [K58 2]      Operative Findings:    Colonoscopy Procedure Note    Procedure: Colonoscopy    Sedation: Monitored anesthesia care, check anesthesia records      ASA Class: 2    INDICATIONS:  Colon cancer screening    POST-OP DIAGNOSIS: See the impression below    Procedure Details     Prior colonoscopy: 5 years ago  Informed consent was obtained for the procedure, including sedation  Risks of perforation, hemorrhage, adverse drug reaction and aspiration were discussed  The patient was placed in the left lateral decubitus position  Based on the pre-procedure assessment, including review of the patient's medical history, medications, allergies, and review of systems, she had been deemed to be an appropriate candidate for conscious sedation; she was therefore sedated with the medications listed below  The patient was monitored continuously with telemetry, pulse oximetry, blood pressure monitoring, and direct observations  A rectal examination was performed  The colonoscope was inserted into the rectum and advanced under direct vision to the cecum, which was identified by the ileocecal valve and appendiceal orifice    The quality of the colonic preparation was good  A careful inspection was made as the colonoscope was withdrawn, including a retroflexed view of the rectum; findings and interventions are described below  Findings:  Normal colonoscopy on direct and retroflexed views  Complications: None; patient tolerated the procedure well  Impression:    Normal colonoscopy on direct and retroflexed views  Recommendations:  Repeat colonoscopy in 10 years or sooner if clinically indicated        SIGNATURE: Shai Carballo DO  DATE: October 18, 2018  TIME: 11:30 AM

## 2018-10-18 NOTE — ANESTHESIA PREPROCEDURE EVALUATION
Review of Systems/Medical History  Patient summary reviewed    No history of anesthetic complications     Cardiovascular  Negative cardio ROS Exercise tolerance (METS): >4,     Pulmonary  Negative pulmonary ROS        GI/Hepatic    Bowel prep  Comment: IBS     Negative  ROS        Endo/Other  Negative endo/other ROS      GYN  Negative gynecology ROS          Hematology  Negative hematology ROS      Musculoskeletal  Negative musculoskeletal ROS        Neurology      Comment: Chiari Syndrome dx 10+ years ago, asymptomatic  Psychology   Negative psychology ROS              Physical Exam    Airway    Mallampati score: II  TM Distance: >3 FB  Neck ROM: full     Dental   No notable dental hx     Cardiovascular  Comment: Negative ROS, Rhythm: regular, Rate: normal,     Pulmonary  Breath sounds clear to auscultation,     Other Findings        Anesthesia Plan  ASA Score- 2     Anesthesia Type- IV sedation with anesthesia with ASA Monitors  Additional Monitors:   Airway Plan:         Plan Factors-Patient not instructed to abstain from smoking on day of procedure  Patient did not smoke on day of surgery  Induction- intravenous  Postoperative Plan-     Informed Consent- Anesthetic plan and risks discussed with patient

## 2018-11-20 ENCOUNTER — OFFICE VISIT (OUTPATIENT)
Dept: GASTROENTEROLOGY | Facility: CLINIC | Age: 58
End: 2018-11-20
Payer: COMMERCIAL

## 2018-11-20 VITALS
WEIGHT: 129 LBS | HEIGHT: 67 IN | HEART RATE: 59 BPM | DIASTOLIC BLOOD PRESSURE: 64 MMHG | SYSTOLIC BLOOD PRESSURE: 99 MMHG | BODY MASS INDEX: 20.25 KG/M2 | TEMPERATURE: 98 F

## 2018-11-20 DIAGNOSIS — K58.0 IRRITABLE BOWEL SYNDROME WITH DIARRHEA: Primary | ICD-10-CM

## 2018-11-20 DIAGNOSIS — Z12.11 SCREENING FOR COLON CANCER: ICD-10-CM

## 2018-11-20 PROCEDURE — 99213 OFFICE O/P EST LOW 20 MIN: CPT | Performed by: PHYSICIAN ASSISTANT

## 2018-11-20 NOTE — PROGRESS NOTES
[facility] - Outpatient Follow-up Note  Akbar Cee 66 y o  female MRN: [id number]  Encounter: [id number]          ASSESSMENT AND PLAN:      1  Irritable bowel syndrome with both constipation and diarrhea:  Admits to long standing symptoms of irritable bowel syndrome since 10years old  She recently underwent colonoscopy, which was normal   She continues to complain of intermittent abdominal cramping associated with loose stools  She states that she is taking 1 Imodium prophylactically on a daily basis and this controls her symptoms  We did discuss the low FODMAP diet, she is having difficulty following this but she is gluten free  She is not interested in talking with the nutritionist at this time  We discussed possibly treating with another course of Xifaxan in the future, also consider viberzi  -continue low FODMAP diet  -start daily probiotic  -continue to use Imodium as needed  -continue to use Bentyl for spasms  -consider another course of Xifaxan or viberzi in future  -up in 6 months      2  Screening for colon cancer:  Colonoscopy 10/18/2018 was normal and the prep was good  Repeat recommended in 10 years unless clinically indicated sooner  -repeat colonoscopy 10 years    ______________________________________________________________________    SUBJECTIVE:  17-year-old female here for follow-up after colonoscopy and continued symptoms of irritable bowel syndrome  She states that she has a longstanding history of abdominal cramping and diarrhea  She recently underwent colonoscopy in October 2018 that was completely normal with a good bowel prep  She states she continues to have intermittent abdominal cramping associated with loose stools  She is currently using Bentyl for pain as well as Imodium on a daily basis prophylactically  We did discuss retreating with another course of Xifaxan, she would like to hold off from this time as she feels fairly well    We also discussed possibly starting Viberzi in the future  She denies any dysphagia, abnormal weight loss, melena, hematochezia  REVIEW OF SYSTEMS IS OTHERWISE NEGATIVE  Historical Information   Past Medical History:   Diagnosis Date    Chiari syndrome ([address] )     IBS (irritable bowel syndrome)      Past Surgical History:   Procedure Laterality Date    COLONOSCOPY      every 5 years    KNEE ARTHROSCOPY Left     NASAL SEPTUM SURGERY      AL COLONOSCOPY FLX DX W/COLLJ SPEC WHEN PFRMD N/A 10/18/2018    Procedure: COLONOSCOPY;  Surgeon: Damien Ojeda DO;  Location: [facility] GI LAB; Service: Gastroenterology    UPPER GASTROINTESTINAL ENDOSCOPY       Social History   History   Alcohol Use    Yes     Comment: occasional     History   Drug Use No     History   Smoking Status    Never Smoker   Smokeless Tobacco    Never Used     Family History   Problem Relation Age of Onset    Heart disease Father     Other Father         Myocardial Ischemia    Coronary artery disease Family     Emphysema Other     Heart attack Other     Heart disease Other     Hyperlipidemia Other        Meds/Allergies       Current Outpatient Prescriptions:     dicyclomine (BENTYL) 10 mg capsule    loperamide (IMODIUM A-D) 2 MG tablet    PEG 3350-KCl-NaCl-NaSulf-Na Asc-C (MOVIPREP) 100 g    Allergies   Allergen Reactions    Eggs Or Egg-Derived Products     Erythromycin     Gluten Meal     Milk-Related Compounds            Objective     Blood pressure 99/64, pulse 59, temperature 98 °F (36 7 °C), temperature source Tympanic, height 5' 7" (1 702 m), weight 58 5 kg (129 lb)  Body mass index is 20 2 kg/m²  PHYSICAL EXAM:      General Appearance:   Alert, cooperative, no distress   HEENT:   Normocephalic, atraumatic, anicteric  Right eye exhibits no discharge  Left eye exhibits no discharge   No scleral icterus       Neck:  Supple, symmetrical, trachea midline, no stridor   Lungs:   Clear to auscultation bilaterally; no rales, rhonchi or wheezing; respirations unlabored    Heart[de-identified]   Regular rate and rhythm; no murmur, rub, or gallop  Abdomen:   Soft, non-tender, non-distended; normal bowel sounds; no masses, no organomegaly    Genitalia:   Deferred    Rectal:   Deferred    Extremities:  No cyanosis, clubbing or edema        Skin:  No jaundice, rashes, or lesions          Lab Results:   No visits with results within 1 Day(s) from this visit  Latest known visit with results is:   Transcribe Orders on 12/11/2017   Component Date Value    WBC 12/11/2017 4 59     RBC 12/11/2017 5 15*    Hemoglobin 12/11/2017 14 9     Hematocrit 12/11/2017 44 9     MCV 12/11/2017 87     MCH 12/11/2017 28 9     MCHC 12/11/2017 33 2     RDW 12/11/2017 12 7     Platelets 19/45/9322 233     MPV 12/11/2017 10 8     Sodium 12/11/2017 141     Potassium 12/11/2017 4 4     Chloride 12/11/2017 107     CO2 12/11/2017 30     ANION GAP 12/11/2017 4     BUN 12/11/2017 20     Creatinine 12/11/2017 0 69     Glucose, Fasting 12/11/2017 92     Calcium 12/11/2017 9 2     AST 12/11/2017 20     ALT 12/11/2017 33     Alkaline Phosphatase 12/11/2017 105     Total Protein 12/11/2017 7 2     Albumin 12/11/2017 3 7     Total Bilirubin 12/11/2017 0 43     eGFR 12/11/2017 98     Cholesterol 12/11/2017 220*    Triglycerides 12/11/2017 110     HDL, Direct 12/11/2017 76*    LDL Calculated 12/11/2017 122*    Vit D, 25-Hydroxy 12/11/2017 31 5          Radiology Results:   No results found

## 2019-03-20 ENCOUNTER — TELEPHONE (OUTPATIENT)
Dept: GASTROENTEROLOGY | Facility: CLINIC | Age: 59
End: 2019-03-20

## 2019-03-20 DIAGNOSIS — K58.0 IRRITABLE BOWEL SYNDROME WITH DIARRHEA: Primary | ICD-10-CM

## 2019-04-30 ENCOUNTER — TRANSCRIBE ORDERS (OUTPATIENT)
Dept: LAB | Facility: CLINIC | Age: 59
End: 2019-04-30

## 2019-04-30 ENCOUNTER — APPOINTMENT (OUTPATIENT)
Dept: LAB | Facility: CLINIC | Age: 59
End: 2019-04-30
Payer: COMMERCIAL

## 2019-04-30 DIAGNOSIS — R89.9 UNSP ABNORMAL FINDING IN SPECIMENS FROM OTH ORG/TISS: ICD-10-CM

## 2019-04-30 DIAGNOSIS — Z82.49 FAM HX-ISCHEM HEART DISEASE: ICD-10-CM

## 2019-04-30 DIAGNOSIS — E78.5 HYPERLIPIDEMIA, UNSPECIFIED HYPERLIPIDEMIA TYPE: ICD-10-CM

## 2019-04-30 DIAGNOSIS — E78.5 HYPERLIPIDEMIA, UNSPECIFIED HYPERLIPIDEMIA TYPE: Primary | ICD-10-CM

## 2019-04-30 LAB
25(OH)D3 SERPL-MCNC: 29.9 NG/ML (ref 30–100)
ALBUMIN SERPL BCP-MCNC: 3.7 G/DL (ref 3.5–5)
ALP SERPL-CCNC: 129 U/L (ref 46–116)
ALT SERPL W P-5'-P-CCNC: 27 U/L (ref 12–78)
ANION GAP SERPL CALCULATED.3IONS-SCNC: 3 MMOL/L (ref 4–13)
AST SERPL W P-5'-P-CCNC: 15 U/L (ref 5–45)
BILIRUB SERPL-MCNC: 0.52 MG/DL (ref 0.2–1)
BUN SERPL-MCNC: 14 MG/DL (ref 5–25)
CALCIUM SERPL-MCNC: 8.8 MG/DL (ref 8.3–10.1)
CHLORIDE SERPL-SCNC: 106 MMOL/L (ref 100–108)
CHOLEST SERPL-MCNC: 189 MG/DL (ref 50–200)
CO2 SERPL-SCNC: 29 MMOL/L (ref 21–32)
CREAT SERPL-MCNC: 0.77 MG/DL (ref 0.6–1.3)
GFR SERPL CREATININE-BSD FRML MDRD: 87 ML/MIN/1.73SQ M
GLUCOSE P FAST SERPL-MCNC: 91 MG/DL (ref 65–99)
HDLC SERPL-MCNC: 58 MG/DL (ref 40–60)
LDLC SERPL CALC-MCNC: 94 MG/DL (ref 0–100)
NONHDLC SERPL-MCNC: 131 MG/DL
POTASSIUM SERPL-SCNC: 4 MMOL/L (ref 3.5–5.3)
PROT SERPL-MCNC: 7.2 G/DL (ref 6.4–8.2)
SODIUM SERPL-SCNC: 138 MMOL/L (ref 136–145)
TRIGL SERPL-MCNC: 184 MG/DL
TSH SERPL DL<=0.05 MIU/L-ACNC: 1.58 UIU/ML (ref 0.36–3.74)

## 2019-04-30 PROCEDURE — 82306 VITAMIN D 25 HYDROXY: CPT

## 2019-04-30 PROCEDURE — 80061 LIPID PANEL: CPT

## 2019-04-30 PROCEDURE — 36415 COLL VENOUS BLD VENIPUNCTURE: CPT

## 2019-04-30 PROCEDURE — 80053 COMPREHEN METABOLIC PANEL: CPT

## 2019-04-30 PROCEDURE — 84443 ASSAY THYROID STIM HORMONE: CPT

## 2019-05-21 ENCOUNTER — HOSPITAL ENCOUNTER (OUTPATIENT)
Dept: RADIOLOGY | Age: 59
Discharge: HOME | End: 2019-05-21
Payer: COMMERCIAL

## 2019-05-21 VITALS — WEIGHT: 129 LBS | HEIGHT: 67 IN | BODY MASS INDEX: 20.25 KG/M2

## 2019-05-21 DIAGNOSIS — Z12.39 BREAST CANCER SCREENING: ICD-10-CM

## 2019-05-21 PROCEDURE — 77063 BREAST TOMOSYNTHESIS BI: CPT

## 2019-05-21 PROCEDURE — 77067 SCR MAMMO BI INCL CAD: CPT

## 2019-05-22 ENCOUNTER — OFFICE VISIT (OUTPATIENT)
Dept: GASTROENTEROLOGY | Facility: CLINIC | Age: 59
End: 2019-05-22
Payer: COMMERCIAL

## 2019-05-22 ENCOUNTER — OFFICE VISIT (OUTPATIENT)
Dept: CARDIOLOGY CLINIC | Facility: CLINIC | Age: 59
End: 2019-05-22
Payer: COMMERCIAL

## 2019-05-22 VITALS
DIASTOLIC BLOOD PRESSURE: 64 MMHG | WEIGHT: 128.1 LBS | HEIGHT: 67 IN | HEART RATE: 49 BPM | BODY MASS INDEX: 20.11 KG/M2 | SYSTOLIC BLOOD PRESSURE: 102 MMHG

## 2019-05-22 VITALS
SYSTOLIC BLOOD PRESSURE: 106 MMHG | DIASTOLIC BLOOD PRESSURE: 66 MMHG | TEMPERATURE: 97.5 F | HEIGHT: 67 IN | WEIGHT: 127.2 LBS | HEART RATE: 45 BPM | BODY MASS INDEX: 19.97 KG/M2

## 2019-05-22 DIAGNOSIS — Z12.11 SCREENING FOR COLON CANCER: ICD-10-CM

## 2019-05-22 DIAGNOSIS — K58.0 IRRITABLE BOWEL SYNDROME WITH DIARRHEA: ICD-10-CM

## 2019-05-22 DIAGNOSIS — K58.2 IRRITABLE BOWEL SYNDROME WITH BOTH CONSTIPATION AND DIARRHEA: Primary | ICD-10-CM

## 2019-05-22 DIAGNOSIS — R07.9 CHEST PAIN, UNSPECIFIED TYPE: Primary | ICD-10-CM

## 2019-05-22 DIAGNOSIS — Z82.49 FAMILY HISTORY OF CORONARY ARTERY DISEASE: ICD-10-CM

## 2019-05-22 PROCEDURE — 93000 ELECTROCARDIOGRAM COMPLETE: CPT | Performed by: INTERNAL MEDICINE

## 2019-05-22 PROCEDURE — 99204 OFFICE O/P NEW MOD 45 MIN: CPT | Performed by: INTERNAL MEDICINE

## 2019-05-22 PROCEDURE — 99214 OFFICE O/P EST MOD 30 MIN: CPT | Performed by: INTERNAL MEDICINE

## 2019-05-22 RX ORDER — MELATONIN
1000 DAILY
COMMUNITY

## 2019-05-22 RX ORDER — MULTIVIT-MIN/IRON FUM/FOLIC AC 7.5 MG-4
1 TABLET ORAL DAILY
COMMUNITY

## 2019-05-22 RX ORDER — MAGNESIUM 30 MG
30 TABLET ORAL DAILY
COMMUNITY

## 2019-05-28 ENCOUNTER — HOSPITAL ENCOUNTER (OUTPATIENT)
Dept: NON INVASIVE DIAGNOSTICS | Facility: CLINIC | Age: 59
Discharge: HOME | End: 2019-05-28
Payer: COMMERCIAL

## 2019-05-28 DIAGNOSIS — Z82.49 FAMILY HISTORY OF CORONARY ARTERY DISEASE: ICD-10-CM

## 2019-05-28 DIAGNOSIS — R07.9 CHEST PAIN, UNSPECIFIED TYPE: ICD-10-CM

## 2019-05-28 LAB
CHEST PAIN STATEMENT: NORMAL
MAX DIASTOLIC BP: 74 MMHG
MAX HEART RATE: 169 BPM
MAX PREDICTED HEART RATE: 164 BPM
MAX. SYSTOLIC BP: 182 MMHG
PROTOCOL NAME: NORMAL
TARGET HR FORMULA: NORMAL
TEST INDICATION: NORMAL
TIME IN EXERCISE PHASE: NORMAL

## 2019-05-28 PROCEDURE — 93017 CV STRESS TEST TRACING ONLY: CPT

## 2019-05-28 PROCEDURE — 93016 CV STRESS TEST SUPVJ ONLY: CPT | Performed by: INTERNAL MEDICINE

## 2019-05-28 PROCEDURE — 93018 CV STRESS TEST I&R ONLY: CPT | Performed by: INTERNAL MEDICINE

## 2019-05-30 ENCOUNTER — TELEPHONE (OUTPATIENT)
Dept: CARDIOLOGY CLINIC | Facility: CLINIC | Age: 59
End: 2019-05-30

## 2019-06-26 ENCOUNTER — HOSPITAL ENCOUNTER (OUTPATIENT)
Dept: NON INVASIVE DIAGNOSTICS | Facility: CLINIC | Age: 59
Discharge: HOME | End: 2019-06-26
Payer: COMMERCIAL

## 2019-06-26 DIAGNOSIS — R07.9 CHEST PAIN, UNSPECIFIED TYPE: ICD-10-CM

## 2019-06-26 DIAGNOSIS — Z82.49 FAMILY HISTORY OF CORONARY ARTERY DISEASE: ICD-10-CM

## 2019-06-26 PROCEDURE — VASC: Performed by: SURGERY

## 2019-06-27 ENCOUNTER — TELEPHONE (OUTPATIENT)
Dept: CARDIOLOGY CLINIC | Facility: CLINIC | Age: 59
End: 2019-06-27

## 2019-09-10 ENCOUNTER — ANNUAL EXAM (OUTPATIENT)
Dept: OBGYN CLINIC | Facility: CLINIC | Age: 59
End: 2019-09-10
Payer: COMMERCIAL

## 2019-09-10 VITALS
DIASTOLIC BLOOD PRESSURE: 64 MMHG | SYSTOLIC BLOOD PRESSURE: 104 MMHG | WEIGHT: 126.8 LBS | BODY MASS INDEX: 19.9 KG/M2 | HEIGHT: 67 IN

## 2019-09-10 DIAGNOSIS — N34.2 INFECTIVE URETHRITIS: Primary | ICD-10-CM

## 2019-09-10 DIAGNOSIS — Z12.39 BREAST CANCER SCREENING: ICD-10-CM

## 2019-09-10 DIAGNOSIS — Z01.419 WOMEN'S ANNUAL ROUTINE GYNECOLOGICAL EXAMINATION: ICD-10-CM

## 2019-09-10 PROCEDURE — S0612 ANNUAL GYNECOLOGICAL EXAMINA: HCPCS | Performed by: OBSTETRICS & GYNECOLOGY

## 2019-09-10 RX ORDER — ALPRAZOLAM 0.25 MG/1
TABLET ORAL
Refills: 0 | COMMUNITY
Start: 2019-06-21

## 2019-09-10 RX ORDER — NITROFURANTOIN MACROCRYSTALS 50 MG/1
CAPSULE ORAL
Qty: 30 CAPSULE | Refills: 3 | Status: SHIPPED | OUTPATIENT
Start: 2019-09-10 | End: 2020-09-03

## 2019-09-10 NOTE — PATIENT INSTRUCTIONS
The patient was informed of a stable menopausal gyn examination  I have asked her to start Macrodantin after intercourse 50 mg  We did should improve her recurrent problem honeymoon cystitis  Her mammograms in colonoscopies up-to-date  She is happy with her weight  She has a dentist on a regular basis  She is dealing with the loss of 2 brothers this year  She should return my office in 1 year

## 2019-09-10 NOTE — PROGRESS NOTES
Assessment/Plan:    The patient was informed of a stable menopausal gyn examination  A Pap smear was performed  Her colonoscopies and mammograms are up-to-date  She is still grieving the loss of her 2 brothers year  Overall she is doing better  Review of her history of recurrent UTIs with intercourse will now start her on Macrobid Moshe 50 mg capsules after each episode of intercourse  If this does not include situation she will contact me  She should return my office in 1 year  Will consider DEXA scan after 62th birthday         Subjective:      Patient ID: Julio Cesar Donnelly is a 64 y o  female  HPI    This is a 51-year-old white female, she is a  4 para 3 with 3 prior vaginal deliveries  She is now in menopause  Menopause symptoms are under control  She has been a lot of stress in her life this past year, she lost to her for brother is a 1 to a hepatitis A  infection, and wants to a heart attack  Her family history is positive for male heart disease  She recently had a cardiology evaluation she was found to be stable  She denies any major  complaints  She does have a history of irritable bowel syndrome and is followed carefully by a GI specialist   She is now complaining of urgency and frequency after intercourse consistent with a honeymoon cystitis  She is happy with her weight  She has a dentist on a regular basis  Colonoscopies and mammograms are up-to-date      The following portions of the patient's history were reviewed and updated as appropriate: allergies, current medications, past family history, past medical history, past social history, past surgical history and problem list     Review of Systems   Genitourinary:        Complained of UTI symptoms after intercourse         Objective:      /64   Ht 5' 7" (1 702 m)   Wt 57 5 kg (126 lb 12 8 oz)   BMI 19 86 kg/m²          Physical Exam   Constitutional: She is oriented to person, place, and time   She appears well-developed and well-nourished  HENT:   Head: Normocephalic and atraumatic  Eyes: EOM are normal    Neck: Normal range of motion  Neck supple  Cardiovascular: Normal rate, regular rhythm and normal heart sounds  Pulmonary/Chest: Effort normal  No stridor  No respiratory distress  She has no wheezes  Right breast exhibits no inverted nipple, no mass, no nipple discharge, no skin change and no tenderness  Left breast exhibits no inverted nipple, no mass, no nipple discharge, no skin change and no tenderness  No breast swelling, tenderness, discharge or bleeding  Breasts are symmetrical    The axilla is clear bilaterally   Abdominal: Soft  Bowel sounds are normal  She exhibits no distension and no mass  There is no tenderness  There is no rebound and no guarding  No hernia  Hernia confirmed negative in the right inguinal area and confirmed negative in the left inguinal area  Genitourinary: Rectum normal, vagina normal and uterus normal  No labial fusion  There is no rash, tenderness, lesion or injury on the right labia  There is no rash, tenderness, lesion or injury on the left labia  Uterus is not deviated, not enlarged, not fixed and not tender  Cervix exhibits no motion tenderness, no discharge and no friability  Right adnexum displays no mass, no tenderness and no fullness  Left adnexum displays no mass, no tenderness and no fullness  No erythema, tenderness or bleeding in the vagina  No foreign body in the vagina  No signs of injury around the vagina  No vaginal discharge found  Genitourinary Comments: There is evidence of atrophic changes of the vagina around the urethra consistent with menopause  This is probably the source of recurrent UTIs after intercourse, a Pap smear was performed   Musculoskeletal: Normal range of motion  She exhibits no edema or deformity  Lymphadenopathy: No inguinal adenopathy noted on the right or left side     Neurological: She is alert and oriented to person, place, and time  Skin: Skin is warm and dry  Psychiatric: She has a normal mood and affect   Her behavior is normal

## 2019-09-12 LAB
CLINICAL INFO: NORMAL
CYTO CVX: NORMAL
CYTOLOGY CMNT CVX/VAG CYTO-IMP: NORMAL
DATE PREVIOUS BX: NORMAL
HPV E6+E7 MRNA CVX QL NAA+PROBE: NOT DETECTED
LMP START DATE: NORMAL
SL AMB PREV. PAP:: NORMAL
SPECIMEN SOURCE CVX/VAG CYTO: NORMAL

## 2019-10-01 ENCOUNTER — TELEPHONE (OUTPATIENT)
Dept: GASTROENTEROLOGY | Facility: AMBULARY SURGERY CENTER | Age: 59
End: 2019-10-01

## 2019-10-01 DIAGNOSIS — K58.2 IRRITABLE BOWEL SYNDROME WITH BOTH CONSTIPATION AND DIARRHEA: Primary | ICD-10-CM

## 2019-10-01 NOTE — TELEPHONE ENCOUNTER
Patients GI provider:  Dr Saint Lux    Number to return call: ( [phone]    Reason for call: Pt calling to get xifaxan sent to [facility]    Scheduled procedure/appointment date if applicable: Apt/procedure

## 2019-11-05 ENCOUNTER — OFFICE VISIT (OUTPATIENT)
Dept: CARDIOLOGY CLINIC | Facility: CLINIC | Age: 59
End: 2019-11-05
Payer: COMMERCIAL

## 2019-11-05 VITALS
BODY MASS INDEX: 20.09 KG/M2 | DIASTOLIC BLOOD PRESSURE: 64 MMHG | SYSTOLIC BLOOD PRESSURE: 112 MMHG | HEART RATE: 54 BPM | HEIGHT: 67 IN | WEIGHT: 128 LBS

## 2019-11-05 DIAGNOSIS — R00.2 PALPITATIONS: ICD-10-CM

## 2019-11-05 DIAGNOSIS — Z82.49 FAMILY HISTORY OF PREMATURE CAD: Primary | ICD-10-CM

## 2019-11-05 PROCEDURE — 99214 OFFICE O/P EST MOD 30 MIN: CPT | Performed by: INTERNAL MEDICINE

## 2019-11-05 PROCEDURE — 93000 ELECTROCARDIOGRAM COMPLETE: CPT | Performed by: INTERNAL MEDICINE

## 2019-11-05 NOTE — PROGRESS NOTES
Cardiology Consultation     Milo Grewal  [id number]  1960  HEART & VASCULAR [facility] [place]  [address] [address]    1  Family history of premature CAD  POCT ECG   2  Palpitations  POCT ECG    Holter monitor - 48 hour       Discussion/Summary:    Reviewed the testing she had done, which was quite reassuring  Previous echo was normal     Palpitations, will check Holter monitor  She is to go on vacation, and she is safe to travel in the meantime, can have monitor done when she returns  Previous History:  Strong family history of CAD    Three brothers 2 with heart attacks in the 46s, 1 who  in his 46s presumed to be from a heart attack  Her father had an MI and cardiomyopathy in his 62s as well  She actually saw cardiologist previously when her other brother had coronary disease diagnosed  She had with a nuclear stress test and echocardiogram done in   Both of these were unremarkable  Blood pressure is very well controlled  Cholesterol similarly has been well controlled without any medical therapy  She eats healthy diet  She has IBS and various allergies, therefore sticks to a very well controlled diet overall  Interval History:  Who performed an exercise treadmill test where she had excellent exercise capacity  Normal EKG response  Normal heart rate and blood pressure  She returns to the office today because she had been feeling palpitations  Notices the most in the morning last few days  No other obvious triggers  Given her family history shows gets very concerned about any cardiac symptoms of scheduled follow-up visit  No syncope or presyncope  No other associated symptoms        Patient Active Problem List   Diagnosis    Screening for colon cancer    Irritable bowel syndrome with both constipation and diarrhea     Past Medical History:   Diagnosis Date    Basal cell carcinoma (BCC) in situ of skin     Chiari syndrome (HCC)     IBS (irritable bowel syndrome)      Social History     Tobacco Use    Smoking status: Never Smoker    Smokeless tobacco: Never Used   Substance Use Topics    Alcohol use: Yes     Comment: occasional    Drug use: No      Family History   Problem Relation Age of Onset    Heart disease Father     Other Father         Myocardial Ischemia    Coronary artery disease Family     Emphysema Other     Heart attack Other     Heart disease Other     Hyperlipidemia Other     Breast cancer Maternal Grandmother 61    Heart attack Brother      Past Surgical History:   Procedure Laterality Date    BREAST BIOPSY Right 03/23/2006    COLONOSCOPY      every 5 years    KNEE ARTHROSCOPY Left     NASAL SEPTUM SURGERY      VT COLONOSCOPY FLX DX W/COLLJ SPEC WHEN PFRMD N/A 10/18/2018    Procedure: COLONOSCOPY;  Surgeon: Zenobia Shen DO;  Location: [facility] GI LAB;   Service: Gastroenterology    UPPER GASTROINTESTINAL ENDOSCOPY      VARICOSE VEIN SURGERY Right        Current Outpatient Medications:     ALPRAZolam (XANAX) 0 25 mg tablet, TK 1 T PO Q 8 H PRA, Disp: , Rfl: 0    cholecalciferol (VITAMIN D3) 1,000 units tablet, Take 1,000 Units by mouth daily, Disp: , Rfl:     dicyclomine (BENTYL) 10 mg capsule, Take 1 capsule (10 mg total) by mouth 4 (four) times a day as needed (abd pain), Disp: 120 capsule, Rfl: 3    FIBER ADULT GUMMIES PO, Take by mouth daily, Disp: , Rfl:     loperamide (IMODIUM A-D) 2 MG tablet, Take 1 tablet (2 mg total) by mouth 4 (four) times a day as needed for diarrhea, Disp: 120 tablet, Rfl: 5    magnesium 30 MG tablet, Take 30 mg by mouth daily , Disp: , Rfl:     Multiple Vitamins-Minerals (MULTIVITAMIN WITH MINERALS) tablet, Take 1 tablet by mouth daily, Disp: , Rfl:     nitrofurantoin (MACRODANTIN) 50 mg capsule, Please take 1 capsule after intercourse, Disp: 30 capsule, Rfl: 3    Calcium Carbonate-Vit D-Min (CALCIUM 1200 PO), Take by mouth, Disp: , Rfl:   Allergies   Allergen Reactions    Eggs Or Egg-Derived Products     Erythromycin     Gluten Meal     Milk-Related Compounds        Vitals:    11/05/19 1619   BP: 112/64   BP Location: Right arm   Patient Position: Sitting   Cuff Size: Standard   Pulse: (!) 54   Weight: 58 1 kg (128 lb)   Height: 5' 7" (1 702 m)     Vitals:    11/05/19 1619   Weight: 58 1 kg (128 lb)      Height: 5' 7" (170 2 cm)   Body mass index is 20 05 kg/m²  Physical Exam:  GEN: Maliha Rodriguez appears well, alert and oriented x 3, pleasant and cooperative   HEENT: pupils equal, round, and reactive to light; extraocular muscles intact  NECK: supple, no carotid bruits   HEART: regular rhythm, normal S1 and S2, no murmurs, clicks, gallops or rubs   LUNGS: clear to auscultation bilaterally; no wheezes, rales, or rhonchi   ABDOMEN: normal bowel sounds, soft, no tenderness, no distention  EXTREMITIES: peripheral pulses normal; no clubbing, cyanosis, or edema  NEURO: no focal findings   SKIN: normal without suspicious lesions on exposed skin    ROS:  Positive for IBS, reflux  Except as noted in HPI, is otherwise reviewed in detail and a 12 point review of systems is negative    ROS reviewed and is unchanged    Labs:  Lab Results   Component Value Date     04/28/2014    K 4 0 04/30/2019     04/30/2019    CREATININE 0 77 04/30/2019    BUN 14 04/30/2019    CO2 29 04/30/2019    ALT 27 04/30/2019    AST 15 04/30/2019    GLUF 91 04/30/2019    WBC 4 59 12/11/2017    HGB 14 9 12/11/2017    HCT 44 9 12/11/2017     12/11/2017     Lab Results   Component Value Date    CHOL 210 04/21/2014    CHOL 204 03/03/2014     Lab Results   Component Value Date    HDL 58 04/30/2019    HDL 76 (H) 12/11/2017    HDL 66 (H) 02/10/2016     Lab Results   Component Value Date    LDLCALC 94 04/30/2019    LDLCALC 122 (H) 12/11/2017    LDLCALC 119 (H) 02/10/2016     Lab Results   Component Value Date    TRIG 184 (H) 04/30/2019    TRIG 110 12/11/2017    TRIG 171 (H) 02/10/2016     EKG:  Sinus rhythm, 57 beats per minute  Otherwise normal EKG

## 2019-11-12 ENCOUNTER — PROCEDURE VISIT (OUTPATIENT)
Dept: CARDIOLOGY CLINIC | Facility: CLINIC | Age: 59
End: 2019-11-12

## 2019-11-12 DIAGNOSIS — R00.2 PALPITATIONS: Primary | ICD-10-CM

## 2019-11-12 PROCEDURE — RECHECK: Performed by: INTERNAL MEDICINE

## 2019-11-15 ENCOUNTER — HOSPITAL ENCOUNTER (OUTPATIENT)
Dept: NON INVASIVE DIAGNOSTICS | Facility: HOSPITAL | Age: 59
Discharge: HOME | End: 2019-11-15
Payer: COMMERCIAL

## 2019-11-15 DIAGNOSIS — R00.2 PALPITATIONS: ICD-10-CM

## 2019-11-15 PROCEDURE — 93225 XTRNL ECG REC<48 HRS REC: CPT

## 2019-11-15 PROCEDURE — 93226 XTRNL ECG REC<48 HR SCAN A/R: CPT

## 2019-11-15 PROCEDURE — 93227 XTRNL ECG REC<48 HR R&I: CPT | Performed by: INTERNAL MEDICINE

## 2020-02-06 ENCOUNTER — OFFICE VISIT (OUTPATIENT)
Dept: CARDIOLOGY CLINIC | Facility: CLINIC | Age: 60
End: 2020-02-06
Payer: COMMERCIAL

## 2020-02-06 VITALS
BODY MASS INDEX: 20.4 KG/M2 | DIASTOLIC BLOOD PRESSURE: 66 MMHG | WEIGHT: 130 LBS | HEIGHT: 67 IN | HEART RATE: 54 BPM | SYSTOLIC BLOOD PRESSURE: 122 MMHG | OXYGEN SATURATION: 98 %

## 2020-02-06 DIAGNOSIS — I49.1 PAC (PREMATURE ATRIAL CONTRACTION): ICD-10-CM

## 2020-02-06 DIAGNOSIS — R00.2 PALPITATIONS: Primary | ICD-10-CM

## 2020-02-06 PROCEDURE — 99214 OFFICE O/P EST MOD 30 MIN: CPT | Performed by: INTERNAL MEDICINE

## 2020-02-06 NOTE — PROGRESS NOTES
Cardiology Consultation     Eleele Jennifer  [id number]  1960  HEART & VASCULAR [facility] [place]  [address] [place] [address]    1  Palpitations     2  PAC (premature atrial contraction)         Discussion/Summary:    Palpitations: PACs  Not high burden, not particularly bothersome to her  Continue with conservative management  BP and HR typically on the lower side at baseline, so somewhat reluctant to use beta blocker unless symptoms worsen  She had a skin reaction to the adhesive  If recurrence, could do longer term monitoring with Zio patch  Fam Hx: She is asymptomatic, normal testing with stress test, vascular screen  Continue with lipid control, to have repeat testing with PCP she tells me  No indication for medical therapy  She asked if she could bring in the autopsy report from her brother  I can review this, she wants to make sure there is nothing differently that we should do in her or her children based on the cause of his death  Previous History:  Strong family history of CAD    Three brothers 2 with heart attacks in the 46s, 1 who  in his 46s presumed to be from a heart attack  Her father had an MI and cardiomyopathy in his 62s as well  She actually saw cardiologist previously when her other brother had coronary disease diagnosed  She had with a nuclear stress test and echocardiogram done in 2010  Both of these were unremarkable  Blood pressure is very well controlled  Cholesterol similarly has been well controlled without any medical therapy  She eats healthy diet  She has IBS and various allergies, therefore sticks to a very well controlled diet overall  Interval History:  Since last visit, she had a Holter monitor  PACs noted, but not a high burden (about 1%)  These are about the same  Her BP tends to run on the low side chronically  No chest pain    To see PCP today as well for routine physical   Last lipid panel in April was reviewed with her  LDL is good, TG were high, but can't remember if this was fasting  HDL is good, not as high as the prior test from 12/2017  Patient Active Problem List   Diagnosis    Screening for colon cancer    Irritable bowel syndrome with both constipation and diarrhea     Past Medical History:   Diagnosis Date    Basal cell carcinoma (BCC) in situ of skin     Chiari syndrome (HCC)     IBS (irritable bowel syndrome)      Social History     Tobacco Use    Smoking status: Never Smoker    Smokeless tobacco: Never Used   Substance Use Topics    Alcohol use: Yes     Comment: occasional    Drug use: No      Family History   Problem Relation Age of Onset    Heart disease Father     Other Father         Myocardial Ischemia    Coronary artery disease Family     Emphysema Other     Heart attack Other     Heart disease Other     Hyperlipidemia Other     Breast cancer Maternal Grandmother 61    Heart attack Brother      Past Surgical History:   Procedure Laterality Date    BREAST BIOPSY Right 03/23/2006    COLONOSCOPY      every 5 years    KNEE ARTHROSCOPY Left     NASAL SEPTUM SURGERY      MA COLONOSCOPY FLX DX W/COLLJ SPEC WHEN PFRMD N/A 10/18/2018    Procedure: COLONOSCOPY;  Surgeon: Amish Barnes DO;  Location: [facility];   Service: Gastroenterology    UPPER GASTROINTESTINAL ENDOSCOPY      VARICOSE VEIN SURGERY Right        Current Outpatient Medications:     ALPRAZolam (XANAX) 0 25 mg tablet, TK 1 T PO Q 8 H PRA, Disp: , Rfl: 0    Calcium Carbonate-Vit D-Min (CALCIUM 1200 PO), Take by mouth, Disp: , Rfl:     cholecalciferol (VITAMIN D3) 1,000 units tablet, Take 1,000 Units by mouth daily, Disp: , Rfl:     dicyclomine (BENTYL) 10 mg capsule, Take 1 capsule (10 mg total) by mouth 4 (four) times a day as needed (abd pain), Disp: 120 capsule, Rfl: 3    FIBER ADULT GUMMIES PO, Take by mouth daily, Disp: , Rfl:     loperamide (IMODIUM A-D) 2 MG tablet, Take 1 tablet (2 mg total) by mouth 4 (four) times a day as needed for diarrhea, Disp: 120 tablet, Rfl: 5    magnesium 30 MG tablet, Take 30 mg by mouth daily , Disp: , Rfl:     Multiple Vitamins-Minerals (MULTIVITAMIN WITH MINERALS) tablet, Take 1 tablet by mouth daily, Disp: , Rfl:     nitrofurantoin (MACRODANTIN) 50 mg capsule, Please take 1 capsule after intercourse, Disp: 30 capsule, Rfl: 3  Allergies   Allergen Reactions    Eggs Or Egg-Derived Products     Erythromycin     Gluten Meal     Milk-Related Compounds        Vitals:    02/06/20 0907   BP: 122/66   BP Location: Right arm   Patient Position: Sitting   Cuff Size: Standard   Pulse: (!) 54   SpO2: 98%   Weight: 59 kg (130 lb)   Height: 5' 7" (1 702 m)     Vitals:    02/06/20 0907   Weight: 59 kg (130 lb)      Height: 5' 7" (170 2 cm)   Body mass index is 20 36 kg/m²  Physical Exam:  GEN: Nan Lu appears well, alert and oriented x 3, pleasant and cooperative   HEENT: pupils equal, round, and reactive to light; extraocular muscles intact  NECK: supple, no carotid bruits   HEART: regular rhythm, normal S1 and S2, no murmurs, clicks, gallops or rubs   LUNGS: clear to auscultation bilaterally; no wheezes, rales, or rhonchi   ABDOMEN: normal bowel sounds, soft, no tenderness, no distention  EXTREMITIES: peripheral pulses normal; no clubbing, cyanosis, or edema  NEURO: no focal findings   SKIN: normal without suspicious lesions on exposed skin    ROS:  Positive for IBS, reflux  Except as noted in HPI, is otherwise reviewed in detail and a 12 point review of systems is negative    ROS reviewed and is unchanged    Labs:  Lab Results   Component Value Date     04/28/2014    K 4 0 04/30/2019     04/30/2019    CREATININE 0 77 04/30/2019    BUN 14 04/30/2019    CO2 29 04/30/2019    ALT 27 04/30/2019    AST 15 04/30/2019    GLUF 91 04/30/2019    WBC 4 59 12/11/2017    HGB 14 9 12/11/2017    HCT 44 9 12/11/2017     12/11/2017     Lab Results   Component Value Date    CHOL 210 04/21/2014    CHOL 204 03/03/2014     Lab Results   Component Value Date    HDL 58 04/30/2019    HDL 76 (H) 12/11/2017    HDL 66 (H) 02/10/2016     Lab Results   Component Value Date    LDLCALC 94 04/30/2019    LDLCALC 122 (H) 12/11/2017    LDLCALC 119 (H) 02/10/2016     Lab Results   Component Value Date    TRIG 184 (H) 04/30/2019    TRIG 110 12/11/2017    TRIG 171 (H) 02/10/2016     Stress Test 5/28/19:  STRESS SUMMARY: Duration of exercise was 11 min and 0 sec  The patient exercised to protocol stage 4  Maximal work rate was 13 4 METs  Functional capacity was excellent  Maximal heart rate during stress was 169 bpm ( 103 % of maximal  predicted heart rate)  The heart rate response to stress was normal  There was normal resting blood pressure with an appropriate response to stress  Pre 02 sat 100%  Post 02 sat 98%  The rate-pressure product for the peak heart rate and  blood pressure was 37501  There was no chest pain during stress  The stress test was terminated due to achievement of maximal (symptom limited) exercise and achievement of target heart rate  The stress ECG was negative for ischemia  although limited by artifact at peak exercise  There were no stress arrhythmias or conduction abnormalities      SUMMARY:  -  Stress results: Duration of exercise was 11 min and 0 sec  Functional capacity was excellent  Target heart rate was achieved  There was no chest pain during stress  -  ECG conclusions: The stress ECG was negative for ischemia although limited by artifact at peak exercise      IMPRESSIONS: No evidence of ischemia after maximal exercise without reproduction of symptoms  Holter:  Impression:     Overall borderline hrs of holter monitoring  Predominant rhythm was normal sinus rhythm  Normal diurnal variation of heart rate  Overall low density of supraventricular ectopy during the monitored period    One short burst of atrial tachycardia was noted  There was no ventricular ectopy during the monitored period  Palpitations correlated with normal sinus rhythm or sinus tachycardia

## 2020-03-10 ENCOUNTER — OFFICE VISIT (OUTPATIENT)
Dept: OBGYN CLINIC | Facility: CLINIC | Age: 60
End: 2020-03-10
Payer: COMMERCIAL

## 2020-03-10 VITALS
DIASTOLIC BLOOD PRESSURE: 70 MMHG | HEIGHT: 67 IN | WEIGHT: 129.2 LBS | BODY MASS INDEX: 20.28 KG/M2 | SYSTOLIC BLOOD PRESSURE: 116 MMHG

## 2020-03-10 DIAGNOSIS — R07.89 RIGHT-SIDED CHEST WALL PAIN: Primary | ICD-10-CM

## 2020-03-10 PROCEDURE — 99213 OFFICE O/P EST LOW 20 MIN: CPT | Performed by: OBSTETRICS & GYNECOLOGY

## 2020-03-10 NOTE — PATIENT INSTRUCTIONS
The patient was informed that this is not breast pain  X-ray of the chest wall para 8 prior inflammation of the cartilage on the right side  She use nonsteroidal anti-inflammatory agents  She will keep me informed of her progress  She is not better in 3 weeks she will return my office for re-evaluation

## 2020-03-10 NOTE — PROGRESS NOTES
This is a 72-year-old white female, she is multiparous, she is menopausal   She is now complaining about a 7-10 day duration of a right-sided chest wall pain  She initially thought it was breast pain  The pain is approximately 5 cm inferior to the axilla  Were able to pull the breast part               This is a 72-year-old white female, she is multiparous, she has also menopausal   She is now complaining about a 7 -10 day course of what she initially proceed to be right breast pain  She could not feel a lump in the breast   She denies any fever chills  Examination shows the pain discomfort is approximately 5 cm inferior to the axilla  We were able to move the breast out of the feel of observation  The pain was actually in the chest wall  It was between the intercostal muscles  Was spent port pressure  This is consistent with inflammation  The breast itself was completely benign there were no lumps or bumps is no masses  The axilla clear bilaterally  Impression inflammation of the intercostal muscles on the right side  Patient use heat  She also use nonsteroidal anti-inflammatory agents  She was reassured if no improvement 3 weeks size she will contact me back

## 2020-03-12 ENCOUNTER — TRANSCRIBE ORDERS (OUTPATIENT)
Dept: ADMINISTRATIVE | Facility: HOSPITAL | Age: 60
End: 2020-03-12

## 2020-03-12 DIAGNOSIS — M25.562 LEFT KNEE PAIN, UNSPECIFIED CHRONICITY: Primary | ICD-10-CM

## 2020-03-20 DIAGNOSIS — K58.2 IRRITABLE BOWEL SYNDROME WITH BOTH CONSTIPATION AND DIARRHEA: ICD-10-CM

## 2020-03-20 DIAGNOSIS — K58.0 IRRITABLE BOWEL SYNDROME WITH DIARRHEA: ICD-10-CM

## 2020-03-20 RX ORDER — DICYCLOMINE HYDROCHLORIDE 10 MG/1
CAPSULE ORAL
Qty: 120 CAPSULE | Refills: 3 | OUTPATIENT
Start: 2020-03-20

## 2020-03-20 RX ORDER — LOPERAMIDE HYDROCHLORIDE 2 MG/1
2 TABLET ORAL 4 TIMES DAILY PRN
Qty: 120 TABLET | Refills: 5 | Status: CANCELLED | OUTPATIENT
Start: 2020-03-20

## 2020-03-20 RX ORDER — LOPERAMIDE HYDROCHLORIDE 2 MG/1
2 TABLET ORAL 4 TIMES DAILY PRN
Qty: 120 TABLET | Refills: 5 | Status: SHIPPED | OUTPATIENT
Start: 2020-03-20 | End: 2020-10-28 | Stop reason: SDUPTHER

## 2020-03-20 RX ORDER — DICYCLOMINE HYDROCHLORIDE 10 MG/1
10 CAPSULE ORAL 4 TIMES DAILY PRN
Qty: 120 CAPSULE | Refills: 3 | Status: SHIPPED | OUTPATIENT
Start: 2020-03-20

## 2020-05-22 ENCOUNTER — HOSPITAL ENCOUNTER (OUTPATIENT)
Dept: RADIOLOGY | Age: 60
Discharge: HOME | End: 2020-05-22
Payer: COMMERCIAL

## 2020-05-22 VITALS — BODY MASS INDEX: 20.4 KG/M2 | HEIGHT: 67 IN | WEIGHT: 130 LBS

## 2020-05-22 DIAGNOSIS — Z12.31 ENCOUNTER FOR SCREENING MAMMOGRAM FOR MALIGNANT NEOPLASM OF BREAST: ICD-10-CM

## 2020-05-22 PROCEDURE — 77067 SCR MAMMO BI INCL CAD: CPT

## 2020-05-22 PROCEDURE — 77063 BREAST TOMOSYNTHESIS BI: CPT

## 2020-09-03 DIAGNOSIS — N34.2 INFECTIVE URETHRITIS: ICD-10-CM

## 2020-09-03 RX ORDER — NITROFURANTOIN MACROCRYSTALS 50 MG/1
CAPSULE ORAL
Qty: 30 CAPSULE | Refills: 3 | Status: SHIPPED | OUTPATIENT
Start: 2020-09-03 | End: 2022-06-22 | Stop reason: SDUPTHER

## 2020-10-28 ENCOUNTER — OFFICE VISIT (OUTPATIENT)
Dept: GASTROENTEROLOGY | Facility: CLINIC | Age: 60
End: 2020-10-28
Payer: COMMERCIAL

## 2020-10-28 VITALS
SYSTOLIC BLOOD PRESSURE: 118 MMHG | HEIGHT: 67 IN | DIASTOLIC BLOOD PRESSURE: 70 MMHG | WEIGHT: 130 LBS | TEMPERATURE: 97.2 F | BODY MASS INDEX: 20.4 KG/M2

## 2020-10-28 DIAGNOSIS — R10.13 DYSPEPSIA: ICD-10-CM

## 2020-10-28 DIAGNOSIS — Z12.11 SCREENING FOR COLON CANCER: ICD-10-CM

## 2020-10-28 DIAGNOSIS — K58.0 IRRITABLE BOWEL SYNDROME WITH DIARRHEA: Primary | ICD-10-CM

## 2020-10-28 DIAGNOSIS — K58.2 IRRITABLE BOWEL SYNDROME WITH BOTH CONSTIPATION AND DIARRHEA: ICD-10-CM

## 2020-10-28 PROCEDURE — 99214 OFFICE O/P EST MOD 30 MIN: CPT | Performed by: INTERNAL MEDICINE

## 2020-10-28 RX ORDER — DICYCLOMINE HCL 20 MG
20 TABLET ORAL EVERY 6 HOURS PRN
Qty: 360 TABLET | Refills: 3 | Status: SHIPPED | OUTPATIENT
Start: 2020-10-28

## 2020-10-28 RX ORDER — LOPERAMIDE HYDROCHLORIDE 2 MG/1
3 TABLET ORAL 4 TIMES DAILY PRN
Qty: 360 TABLET | Refills: 3 | Status: SHIPPED | OUTPATIENT
Start: 2020-10-28

## 2021-02-09 ENCOUNTER — OFFICE VISIT (OUTPATIENT)
Dept: CARDIOLOGY CLINIC | Facility: CLINIC | Age: 61
End: 2021-02-09
Payer: COMMERCIAL

## 2021-02-09 VITALS
HEART RATE: 43 BPM | SYSTOLIC BLOOD PRESSURE: 132 MMHG | DIASTOLIC BLOOD PRESSURE: 84 MMHG | BODY MASS INDEX: 21.12 KG/M2 | HEIGHT: 67 IN | WEIGHT: 134.6 LBS

## 2021-02-09 DIAGNOSIS — E78.5 DYSLIPIDEMIA: ICD-10-CM

## 2021-02-09 DIAGNOSIS — I49.1 PAC (PREMATURE ATRIAL CONTRACTION): ICD-10-CM

## 2021-02-09 DIAGNOSIS — R00.2 PALPITATIONS: Primary | ICD-10-CM

## 2021-02-09 DIAGNOSIS — R00.1 SINUS BRADYCARDIA: ICD-10-CM

## 2021-02-09 PROCEDURE — 99214 OFFICE O/P EST MOD 30 MIN: CPT | Performed by: INTERNAL MEDICINE

## 2021-02-09 PROCEDURE — 93000 ELECTROCARDIOGRAM COMPLETE: CPT | Performed by: INTERNAL MEDICINE

## 2021-02-09 NOTE — PROGRESS NOTES
Cardiology Consultation     Joan Azevedo  [id number]  1960  HEART & VASCULAR [facility] [place]  [address] [address]    1  Palpitations  POCT ECG    TSH, 3rd generation with Free T4 reflex    Comprehensive metabolic panel    CBC and Platelet    Lipid Panel with Direct LDL reflex   2  PAC (premature atrial contraction)     3  Dyslipidemia  TSH, 3rd generation with Free T4 reflex    Comprehensive metabolic panel    CBC and Platelet    Lipid Panel with Direct LDL reflex   4  Sinus bradycardia  TSH, 3rd generation with Free T4 reflex    Comprehensive metabolic panel    CBC and Platelet    Lipid Panel with Direct LDL reflex       Discussion/Summary:      Strong family history of coronary artery disease, but her risk factors are currently well modified  Will update her lipid panel  She has sinus bradycardia  She denies any symptoms  No evidence of high-degree AV block  She did have a Holter monitor in the past which showed an average heart rate at 62, and only bradycardia in the 30s at night  She was a fitness tracker  Denies any symptoms  Is not on any josse blocking agents  Continue with conservative management  Can repeat Holter monitor in the future if necessary, but we get an average sense of her heart rate with her Fitbit  Will check TSH and basic blood work  she does have a history of PACs, these are asymptomatic  If she would was to develop symptoms in the future, explained that treating them medication may be difficult given her low resting heart rate  Will repeat basic blood work  She has follow-up with her family physician next month as well  Previous History:  Strong family history of CAD    Three brothers, 2 with heart attacks in the 46s, 1 who  in his 46s presumed to be from a heart attack  Her father had an MI and cardiomyopathy in his 62s as well      She actually saw cardiologist previously when her other brother had coronary disease diagnosed  She had with a nuclear stress test and echocardiogram done in 2010  Both of these were unremarkable  Blood pressure is typically low (no medication)  Cholesterol similarly has been well controlled without any medical therapy  She eats healthy diet  She has IBS and various allergies, therefore sticks to a very well controlled diet overall  Previously with PACs noted on Holter monitor  Interval History:    Returns for follow-up  Overall, she is doing pretty well  Her heart rate is noted to be low, she does have sinus bradycardia  She wears a fitness tracker  Says her average heart rate is typically in the 40s to 50s  Not in the 30s, denies any dizziness or lightheadedness  No recent blood work has been done  She says despite her very good diet and exercise, she seems to be gaining weight  Has not had any chest pain  She remains active and exercises regularly      Patient Active Problem List   Diagnosis    Screening for colon cancer    Irritable bowel syndrome with both constipation and diarrhea    Dyslipidemia     Past Medical History:   Diagnosis Date    Basal cell carcinoma (BCC) in situ of skin     Chiari syndrome (HCC)     IBS (irritable bowel syndrome)      Social History     Tobacco Use    Smoking status: Never Smoker    Smokeless tobacco: Never Used   Substance Use Topics    Alcohol use: Yes     Comment: occasional    Drug use: No      Family History   Problem Relation Age of Onset    Heart disease Father     Other Father         Myocardial Ischemia    Coronary artery disease Family     Emphysema Other     Heart attack Other     Heart disease Other     Hyperlipidemia Other     Breast cancer Maternal Grandmother 61    Heart attack Brother     No Known Problems Mother     No Known Problems Daughter     No Known Problems Maternal Grandfather     No Known Problems Paternal Grandmother     No Known Problems Paternal Grandfather      Past Surgical History:   Procedure Laterality Date    BREAST BIOPSY Right 03/23/2006    core    COLONOSCOPY      every 5 years    KNEE ARTHROSCOPY Left     NASAL SEPTUM SURGERY      WA COLONOSCOPY FLX DX W/COLLJ SPEC WHEN PFRMD N/A 10/18/2018    Procedure: COLONOSCOPY;  Surgeon: Rodolfo Castellon DO;  Location: [facility];   Service: Gastroenterology    UPPER GASTROINTESTINAL ENDOSCOPY      VARICOSE VEIN SURGERY Right        Current Outpatient Medications:     ALPRAZolam (XANAX) 0 25 mg tablet, TK 1 T PO Q 8 H PRA, Disp: , Rfl: 0    Calcium Carbonate-Vit D-Min (CALCIUM 1200 PO), Take by mouth, Disp: , Rfl:     cholecalciferol (VITAMIN D3) 1,000 units tablet, Take 1,000 Units by mouth daily, Disp: , Rfl:     dicyclomine (BENTYL) 10 mg capsule, Take 1 capsule (10 mg total) by mouth 4 (four) times a day as needed (abd pain), Disp: 120 capsule, Rfl: 3    dicyclomine (BENTYL) 20 mg tablet, Take 1 tablet (20 mg total) by mouth every 6 (six) hours as needed (abd pain), Disp: 360 tablet, Rfl: 3    FIBER ADULT GUMMIES PO, Take by mouth daily, Disp: , Rfl:     loperamide (IMODIUM A-D) 2 MG tablet, Take 1 5 tablets (3 mg total) by mouth 4 (four) times a day as needed for diarrhea, Disp: 360 tablet, Rfl: 3    magnesium 30 MG tablet, Take 30 mg by mouth daily , Disp: , Rfl:     Multiple Vitamins-Minerals (MULTIVITAMIN WITH MINERALS) tablet, Take 1 tablet by mouth daily, Disp: , Rfl:     nitrofurantoin (MACRODANTIN) 50 mg capsule, TAKE 1 CAPSULE BY MOUTH AFTER INTERCOURSE, Disp: 30 capsule, Rfl: 3  Allergies   Allergen Reactions    Egg Phosphatides-Glycerin-Soybean Oil  [Fat Emulsion Plant Based] Abdominal Pain    Gluten Meal Abdominal Pain    Lactose Abdominal Pain    Azithromycin Other (See Comments)     Stomach cramping    Eggs Or Egg-Derived Products     Erythromycin     Erythromycin Base     Keflex [Cephalexin] Other (See Comments) Severe stomach cramping    Milk-Related Compounds        Vitals:    02/09/21 1059   BP: 132/84   BP Location: Right arm   Patient Position: Sitting   Cuff Size: Adult   Pulse: (!) 43   Weight: 61 1 kg (134 lb 9 6 oz)   Height: 5' 7" (1 702 m)     Vitals:    02/09/21 1059   Weight: 61 1 kg (134 lb 9 6 oz)      Height: 5' 7" (170 2 cm)   Body mass index is 21 08 kg/m²  Physical Exam:  GEN: Meghna Franco appears well, alert and oriented x 3, pleasant and cooperative   HEENT: pupils equal, round, and reactive to light; extraocular muscles intact  NECK: supple, no carotid bruits   HEART: bradycardic, normal S1 and S2, no murmurs, clicks, gallops or rubs   LUNGS: clear to auscultation bilaterally; no wheezes, rales, or rhonchi   ABDOMEN: normal bowel sounds, soft, no tenderness, no distention  EXTREMITIES: peripheral pulses normal; no clubbing, cyanosis, or edema  NEURO: no focal findings   SKIN: normal without suspicious lesions on exposed skin    ROS:  Positive for IBS, reflux  Except as noted in HPI, is otherwise reviewed in detail and a 12 point review of systems is negative    ROS reviewed and is unchanged    Labs:  Lab Results   Component Value Date     04/28/2014    K 4 0 04/30/2019     04/30/2019    CREATININE 0 77 04/30/2019    BUN 14 04/30/2019    CO2 29 04/30/2019    ALT 27 04/30/2019    AST 15 04/30/2019    GLUF 91 04/30/2019    WBC 4 59 12/11/2017    HGB 14 9 12/11/2017    HCT 44 9 12/11/2017     12/11/2017     Lab Results   Component Value Date    CHOL 210 04/21/2014    CHOL 204 03/03/2014     Lab Results   Component Value Date    HDL 58 04/30/2019    HDL 76 (H) 12/11/2017    HDL 66 (H) 02/10/2016     Lab Results   Component Value Date    LDLCALC 94 04/30/2019    LDLCALC 122 (H) 12/11/2017    LDLCALC 119 (H) 02/10/2016     Lab Results   Component Value Date    TRIG 184 (H) 04/30/2019    TRIG 110 12/11/2017    TRIG 171 (H) 02/10/2016     Stress Test 5/28/19:  STRESS SUMMARY: Duration of exercise was 11 min and 0 sec  The patient exercised to protocol stage 4  Maximal work rate was 13 4 METs  Functional capacity was excellent  Maximal heart rate during stress was 169 bpm ( 103 % of maximal  predicted heart rate)  The heart rate response to stress was normal  There was normal resting blood pressure with an appropriate response to stress  Pre 02 sat 100%  Post 02 sat 98%  The rate-pressure product for the peak heart rate and  blood pressure was 71558  There was no chest pain during stress  The stress test was terminated due to achievement of maximal (symptom limited) exercise and achievement of target heart rate  The stress ECG was negative for ischemia  although limited by artifact at peak exercise  There were no stress arrhythmias or conduction abnormalities      SUMMARY:  -  Stress results: Duration of exercise was 11 min and 0 sec  Functional capacity was excellent  Target heart rate was achieved  There was no chest pain during stress  -  ECG conclusions: The stress ECG was negative for ischemia although limited by artifact at peak exercise      IMPRESSIONS: No evidence of ischemia after maximal exercise without reproduction of symptoms  Holter:  Impression:   Overall borderline hrs of holter monitoring  Predominant rhythm was normal sinus rhythm  Normal diurnal variation of heart rate  Overall low density of supraventricular ectopy during the monitored period  One short burst of atrial tachycardia was noted  There was no ventricular ectopy during the monitored period  Palpitations correlated with normal sinus rhythm or sinus tachycardia  EKG:    Sinus bradycardia  43 beats per minute  Sinus arrhythmia noted  No PACs on rhythm strip

## 2021-02-22 ENCOUNTER — ANNUAL EXAM (OUTPATIENT)
Dept: OBGYN CLINIC | Facility: CLINIC | Age: 61
End: 2021-02-22
Payer: COMMERCIAL

## 2021-02-22 VITALS
WEIGHT: 137.6 LBS | SYSTOLIC BLOOD PRESSURE: 120 MMHG | DIASTOLIC BLOOD PRESSURE: 80 MMHG | HEIGHT: 67 IN | BODY MASS INDEX: 21.6 KG/M2

## 2021-02-22 DIAGNOSIS — Z01.419 WOMEN'S ANNUAL ROUTINE GYNECOLOGICAL EXAMINATION: ICD-10-CM

## 2021-02-22 DIAGNOSIS — Z12.31 ENCOUNTER FOR SCREENING MAMMOGRAM FOR MALIGNANT NEOPLASM OF BREAST: Primary | ICD-10-CM

## 2021-02-22 PROCEDURE — S0612 ANNUAL GYNECOLOGICAL EXAMINA: HCPCS | Performed by: OBSTETRICS & GYNECOLOGY

## 2021-02-22 NOTE — PATIENT INSTRUCTIONS
The patient was informed of a stable gyn examination  A Pap smear was not performed  We were able to demonstrate that her right side breast pain was actually chest wall consistent in dryness and not breast tissue itself  She will get her mammogram as directed  She is not happy with her weight  She return my office in 1 year  Colonoscopies are up-to-date

## 2021-02-22 NOTE — PROGRESS NOTES
Assessment/Plan:      The patient was informed of a stable menopausal gyn examination  A Pap smear was not performed  She is not happy with her weight  Denies any problem with depression or anxiety  She has a dentist on a regular basis  She feels safe at home  She is dealing well with the stress of losing 2 brothers  We should consider DEXA scan after 62th birthday  She should return my office in 1 year  The right breast pain is actually consistent with controlled is not breast tissue itself the 6 explained shown to the patient  She was instructed to use nonsteroidal anti-inflammatory agents over the next 2 weeks see if the chest wall pain improved  Subjective:      Patient ID: Chino Maxwell is a 64 y o  female  HPI  This is a 62-year-old white female, she is a  4 para 3 with 3 prior vaginal deliveries  She is now menopausal   She has a history of IBS  She has a GS pressures on a regular basis  She is not happy with her weight  She is dealing well with COVID  She still dealing with the loss of her 2 brothers  She is also complaining of some right breast discomfort  She is still sexually active  Occasional vaginal dryness  There are no new major family illnesses report  She has a dentist on a regular basis  She does have a history of anxiety but no depression  She has been on the professional counseling in the past she may reconsider  Denies any problem with bladder control  She feels very safe at home  The following portions of the patient's history were reviewed and updated as appropriate: allergies, current medications, past family history, past medical history, past social history, past surgical history and problem list     Review of Systems   Genitourinary:        Right breast discomfort         Objective:      /80   Ht 5' 7" (1 702 m)   Wt 62 4 kg (137 lb 9 6 oz)   BMI 21 55 kg/m²          Physical Exam  Vitals signs reviewed   Exam conducted with a chaperone present  Constitutional:       Appearance: Normal appearance  She is normal weight  HENT:      Head: Normocephalic  Eyes:      Extraocular Movements: Extraocular movements intact  Neck:      Musculoskeletal: Normal range of motion and neck supple  Cardiovascular:      Rate and Rhythm: Normal rate and regular rhythm  Pulses: Normal pulses  Heart sounds: Normal heart sounds  Pulmonary:      Effort: Pulmonary effort is normal       Breath sounds: Normal breath sounds  Chest:      Breasts:         Right: Normal  No swelling, bleeding, inverted nipple, mass, nipple discharge or skin change  Left: Normal  No swelling, bleeding, inverted nipple, mass, nipple discharge or skin change  Comments: The patient is  Complaining about right breast pain however is not the breast tissue itself is the chest wall that is uncomfortable consistent with chondritis  The breast tissue itself is unremarkable  Abdominal:      General: Abdomen is flat  Bowel sounds are normal  There is no distension  Palpations: Abdomen is soft  There is no splenomegaly or mass  Tenderness: There is no abdominal tenderness  There is no guarding or rebound  Hernia: No hernia is present  There is no hernia in the left inguinal area or right inguinal area  Genitourinary:     General: Normal vulva  Pubic Area: No rash or pubic lice  Labia:         Right: No rash, tenderness, lesion or injury  Left: No rash, tenderness, lesion or injury  Urethra: No prolapse, urethral pain, urethral swelling or urethral lesion  Vagina: Normal  No signs of injury and foreign body  No vaginal discharge, erythema, tenderness, bleeding, lesions or prolapsed vaginal walls  Cervix: Normal       Uterus: Normal  Not deviated, not enlarged, not fixed, not tender and no uterine prolapse         Adnexa: Right adnexa normal and left adnexa normal         Right: No mass, tenderness or fullness  Left: No mass, tenderness or fullness  Rectum: Normal  Guaiac result negative  No mass, tenderness, anal fissure or external hemorrhoid  Comments: The external genitalia normal limits the vagina is clean the rectal normal appearance  The vagina is clean consistent with menopause  Cervix is parous but closed uterus is midposition normal size is no cervical motion tenderness  Adnexa clear bilaterally  A Pap smear was not performed  There was no evidence of prolapse  Urethra bladder or uterus  Musculoskeletal: Normal range of motion  General: No swelling or tenderness  Lymphadenopathy:      Upper Body:      Right upper body: No supraclavicular or axillary adenopathy  Left upper body: No supraclavicular or axillary adenopathy  Lower Body: No right inguinal adenopathy  No left inguinal adenopathy  Skin:     General: Skin is warm and dry  Neurological:      General: No focal deficit present  Mental Status: She is alert and oriented to person, place, and time  Psychiatric:         Mood and Affect: Mood normal          Behavior: Behavior normal          Thought Content:  Thought content normal

## 2021-02-24 ENCOUNTER — TRANSCRIBE ORDERS (OUTPATIENT)
Dept: LAB | Facility: CLINIC | Age: 61
End: 2021-02-24

## 2021-02-24 ENCOUNTER — LAB (OUTPATIENT)
Dept: LAB | Facility: CLINIC | Age: 61
End: 2021-02-24
Payer: COMMERCIAL

## 2021-02-24 DIAGNOSIS — R00.2 PALPITATIONS: ICD-10-CM

## 2021-02-24 DIAGNOSIS — R00.1 SINUS BRADYCARDIA: ICD-10-CM

## 2021-02-24 DIAGNOSIS — E78.5 DYSLIPIDEMIA: ICD-10-CM

## 2021-02-24 LAB
ALBUMIN SERPL BCP-MCNC: 3.5 G/DL (ref 3.5–5)
ALP SERPL-CCNC: 104 U/L (ref 46–116)
ALT SERPL W P-5'-P-CCNC: 31 U/L (ref 12–78)
ANION GAP SERPL CALCULATED.3IONS-SCNC: 7 MMOL/L (ref 4–13)
AST SERPL W P-5'-P-CCNC: 21 U/L (ref 5–45)
BILIRUB SERPL-MCNC: 0.26 MG/DL (ref 0.2–1)
BUN SERPL-MCNC: 17 MG/DL (ref 5–25)
CALCIUM SERPL-MCNC: 8.9 MG/DL (ref 8.3–10.1)
CHLORIDE SERPL-SCNC: 107 MMOL/L (ref 100–108)
CHOLEST SERPL-MCNC: 201 MG/DL (ref 50–200)
CO2 SERPL-SCNC: 28 MMOL/L (ref 21–32)
CREAT SERPL-MCNC: 0.78 MG/DL (ref 0.6–1.3)
ERYTHROCYTE [DISTWIDTH] IN BLOOD BY AUTOMATED COUNT: 12.7 % (ref 11.6–15.1)
GFR SERPL CREATININE-BSD FRML MDRD: 84 ML/MIN/1.73SQ M
GLUCOSE P FAST SERPL-MCNC: 92 MG/DL (ref 65–99)
HCT VFR BLD AUTO: 42 % (ref 34.8–46.1)
HDLC SERPL-MCNC: 65 MG/DL
HGB BLD-MCNC: 13.3 G/DL (ref 11.5–15.4)
LDLC SERPL CALC-MCNC: 108 MG/DL (ref 0–100)
MCH RBC QN AUTO: 28.7 PG (ref 26.8–34.3)
MCHC RBC AUTO-ENTMCNC: 31.7 G/DL (ref 31.4–37.4)
MCV RBC AUTO: 91 FL (ref 82–98)
PLATELET # BLD AUTO: 206 THOUSANDS/UL (ref 149–390)
PMV BLD AUTO: 10.2 FL (ref 8.9–12.7)
POTASSIUM SERPL-SCNC: 3.9 MMOL/L (ref 3.5–5.3)
PROT SERPL-MCNC: 6.8 G/DL (ref 6.4–8.2)
RBC # BLD AUTO: 4.63 MILLION/UL (ref 3.81–5.12)
SODIUM SERPL-SCNC: 142 MMOL/L (ref 136–145)
TRIGL SERPL-MCNC: 139 MG/DL
TSH SERPL DL<=0.05 MIU/L-ACNC: 2.28 UIU/ML (ref 0.36–3.74)
WBC # BLD AUTO: 5.31 THOUSAND/UL (ref 4.31–10.16)

## 2021-02-24 PROCEDURE — 36415 COLL VENOUS BLD VENIPUNCTURE: CPT | Performed by: INTERNAL MEDICINE

## 2021-02-24 PROCEDURE — 80061 LIPID PANEL: CPT

## 2021-02-24 PROCEDURE — 80053 COMPREHEN METABOLIC PANEL: CPT | Performed by: INTERNAL MEDICINE

## 2021-02-24 PROCEDURE — 84443 ASSAY THYROID STIM HORMONE: CPT | Performed by: INTERNAL MEDICINE

## 2021-02-24 PROCEDURE — 85027 COMPLETE CBC AUTOMATED: CPT | Performed by: INTERNAL MEDICINE

## 2021-03-04 ENCOUNTER — APPOINTMENT (OUTPATIENT)
Dept: LAB | Facility: CLINIC | Age: 61
End: 2021-03-04
Payer: COMMERCIAL

## 2021-03-04 DIAGNOSIS — R10.13 DYSPEPSIA: ICD-10-CM

## 2021-03-04 PROCEDURE — 36415 COLL VENOUS BLD VENIPUNCTURE: CPT

## 2021-03-04 PROCEDURE — 82784 ASSAY IGA/IGD/IGG/IGM EACH: CPT

## 2021-03-04 PROCEDURE — 83516 IMMUNOASSAY NONANTIBODY: CPT

## 2021-03-04 PROCEDURE — 86255 FLUORESCENT ANTIBODY SCREEN: CPT

## 2021-03-05 LAB
ENDOMYSIUM IGA SER QL: NEGATIVE
GLIADIN PEPTIDE IGA SER-ACNC: 3 UNITS (ref 0–19)
GLIADIN PEPTIDE IGG SER-ACNC: 4 UNITS (ref 0–19)
IGA SERPL-MCNC: 184 MG/DL (ref 87–352)
TTG IGA SER-ACNC: <2 U/ML (ref 0–3)
TTG IGG SER-ACNC: <2 U/ML (ref 0–5)

## 2021-03-11 ENCOUNTER — OFFICE VISIT (OUTPATIENT)
Dept: GASTROENTEROLOGY | Facility: CLINIC | Age: 61
End: 2021-03-11
Payer: COMMERCIAL

## 2021-03-11 VITALS
TEMPERATURE: 97.5 F | SYSTOLIC BLOOD PRESSURE: 113 MMHG | WEIGHT: 134.2 LBS | HEART RATE: 51 BPM | BODY MASS INDEX: 21.06 KG/M2 | DIASTOLIC BLOOD PRESSURE: 68 MMHG | HEIGHT: 67 IN

## 2021-03-11 DIAGNOSIS — K58.0 IRRITABLE BOWEL SYNDROME WITH DIARRHEA: Primary | ICD-10-CM

## 2021-03-11 PROCEDURE — 99213 OFFICE O/P EST LOW 20 MIN: CPT | Performed by: INTERNAL MEDICINE

## 2021-03-11 RX ORDER — BUPIVACAINE HYDROCHLORIDE 2.5 MG/ML
2 INJECTION, SOLUTION EPIDURAL; INFILTRATION; INTRACAUDAL
COMMUNITY

## 2021-03-11 RX ORDER — DICYCLOMINE HYDROCHLORIDE 10 MG/1
10 CAPSULE ORAL EVERY 6 HOURS PRN
Qty: 360 CAPSULE | Refills: 3 | Status: SHIPPED | OUTPATIENT
Start: 2021-03-11

## 2021-03-11 NOTE — PROGRESS NOTES
[facility] - Outpatient Follow-up  Austyn Mcbride 64 y o  female MRN: [id number]  Encounter: [id number]      ASSESSMENT AND PLAN:        1  IBS- C/D  · Long history of IBS for many years  · Symptoms fairly well controlled on Bentyl, fiber gummies and Imodium  · Patient still has urgency with bowel movement which seems to be not bothering her too much  Has these episodes about 2-3 times a day  · Will follow-up in 6 months  Teaching Physician Statement  I performed history and exam of patient  I discussed with the resident  I agree with the resident's  documented findings and plan of care         ______________________________________________________________________    HPI:  This is 64 year female with past medical history of IBS is here for follow-up on her IBS  Patient currently takes Bentyl and Imodium and her symptoms are fairly well controlled  Patient does admits to having urgency with her bowel movements mostly early in the morning  Admits to having 2-3 bowel movements with urgency in the morning although after that she has no other symptoms during the whole day      REVIEW OF SYSTEMS:    CONSTITUTIONAL: Denies any fever, chills, rigors, and weight loss  HEENT: No earache or tinnitus, denies hearing loss or visual disturbances  CARDIOVASCULAR: No chest pain or palpitations   RESPIRATORY: Denies any cough, hemoptysis, shortness of breath or dyspnea on exertion  GASTROINTESTINAL: As noted in the History of Present Illness   GENITOURINARY: No problems with urination, denies any hematuria or dysuria  NEUROLOGIC: No dizziness or vertigo, denies headaches   MUSCULOSKELETAL: Denies any muscle or joint pain   SKIN: Denies skin rashes or itching   ENDOCRINE: Denies excessive thirst, denies intolerance to heat or cold  PSYCHOSOCIAL: Denies depression or anxiety, denies any recent memory loss     Historical Information   Past Medical History:   Diagnosis Date    Basal cell carcinoma (BCC) in situ of skin     Chiari syndrome (HCC)     IBS (irritable bowel syndrome)      Past Surgical History:   Procedure Laterality Date    BREAST BIOPSY Right 03/23/2006    core    COLONOSCOPY      every 5 years    KNEE ARTHROSCOPY Left     NASAL SEPTUM SURGERY      GA COLONOSCOPY FLX DX W/COLLJ SPEC WHEN PFRMD N/A 10/18/2018    Procedure: COLONOSCOPY;  Surgeon: Antonia Stacy DO;  Location: [facility]; Service: Gastroenterology    UPPER GASTROINTESTINAL ENDOSCOPY      VARICOSE VEIN SURGERY Right      Social History   Social History     Substance and Sexual Activity   Alcohol Use Yes    Comment: occasional     Social History     Substance and Sexual Activity   Drug Use No     Social History     Tobacco Use   Smoking Status Never Smoker   Smokeless Tobacco Never Used     Family History   Problem Relation Age of Onset    Heart disease Father     Other Father         Myocardial Ischemia    Coronary artery disease Family     Emphysema Other     Heart attack Other     Heart disease Other     Hyperlipidemia Other     Breast cancer Maternal Grandmother 61    Heart attack Brother     No Known Problems Mother     No Known Problems Daughter     No Known Problems Maternal Grandfather     No Known Problems Paternal Grandmother     No Known Problems Paternal Grandfather        Meds/Allergies   (Not in a hospital admission)    No current facility-administered medications for this visit        Allergies   Allergen Reactions    Egg Phosphatides-Glycerin-Soybean Oil  [Fat Emulsion Plant Based] Abdominal Pain    Gluten Meal Abdominal Pain    Lactose Abdominal Pain    Azithromycin Other (See Comments)     Stomach cramping    Eggs Or Egg-Derived Products     Erythromycin     Erythromycin Base     Keflex [Cephalexin] Other (See Comments)     Severe stomach cramping    Milk-Related Compounds          PHYSICAL EXAM:      Objective   Blood pressure 113/68, pulse (!) 51, temperature 97 5 °F (36 4 °C), temperature source Tympanic, height 5' 7" (1 702 m), weight 60 9 kg (134 lb 3 2 oz), not currently breastfeeding  Body mass index is 21 02 kg/m²  General Appearance:   Alert, cooperative, no distress   HEENT:   Normocephalic, atraumatic, anicteric     Neck:   Supple, symmetrical, trachea midline   Lungs:   Clear to auscultation bilaterally; no rales, rhonchi or wheezing; respirations unlabored    Heart:   Regular rate and rhythm; no murmur, rub, or gallop   Abdomen:   Soft, non-tender, non-distended; normal bowel sounds; no masses, no organomegaly    Genitalia:   Deferred    Rectal:   Deferred    Extremities:   No cyanosis, clubbing or edema    Pulses:   2+ and symmetric all extremities    Skin:   No jaundice, rashes, or lesions    Lymph Nodes:   No palpable cervical lymphadenopathy      LAB RESULTS:     No visits with results within 1 Day(s) from this visit  Latest known visit with results is:   Appointment on 03/04/2021   Component Date Value    IgA 03/04/2021 184     Gliadin IgA 03/04/2021 3     Gliadin IgG 03/04/2021 4     Tissue Transglut Ab IGG 03/04/2021 <2     TISSUE TRANSGLUTAMINASE * 03/04/2021 <2     Endomysial IgA 03/04/2021 Negative        RADIOLOGY RESULTS: I have personally reviewed pertinent imaging studies  [facility]  Available on [email or web address]

## 2021-04-29 NOTE — TELEPHONE ENCOUNTER
Dr Patricio Resendiz pt    Pt is requesting a refill for Dicyclomine 10mg   Please send to [facility] in [place] phone# [phone]
[FreeTextEntry1] : ct chest 3/2021  stable nodules but one nodules inc in size 3.3mm to 4.8 mm

## 2021-05-22 ENCOUNTER — HOSPITAL ENCOUNTER (OUTPATIENT)
Dept: RADIOLOGY | Facility: HOSPITAL | Age: 61
Discharge: HOME | End: 2021-05-22
Payer: COMMERCIAL

## 2021-05-22 VITALS — WEIGHT: 135 LBS | HEIGHT: 67 IN | BODY MASS INDEX: 21.19 KG/M2

## 2021-05-22 DIAGNOSIS — Z12.31 ENCOUNTER FOR SCREENING MAMMOGRAM FOR MALIGNANT NEOPLASM OF BREAST: ICD-10-CM

## 2021-05-22 PROCEDURE — 77067 SCR MAMMO BI INCL CAD: CPT

## 2021-05-22 PROCEDURE — 77063 BREAST TOMOSYNTHESIS BI: CPT

## 2021-10-15 ENCOUNTER — TELEPHONE (OUTPATIENT)
Dept: OBGYN CLINIC | Facility: CLINIC | Age: 61
End: 2021-10-15

## 2021-10-15 DIAGNOSIS — R92.2 DENSE BREAST TISSUE ON MAMMOGRAM: Primary | ICD-10-CM

## 2022-02-07 ENCOUNTER — ANNUAL EXAM (OUTPATIENT)
Dept: OBGYN CLINIC | Facility: CLINIC | Age: 62
End: 2022-02-07
Payer: COMMERCIAL

## 2022-02-07 VITALS
BODY MASS INDEX: 21.5 KG/M2 | SYSTOLIC BLOOD PRESSURE: 114 MMHG | DIASTOLIC BLOOD PRESSURE: 70 MMHG | WEIGHT: 137 LBS | HEIGHT: 67 IN

## 2022-02-07 DIAGNOSIS — Z01.419 WOMEN'S ANNUAL ROUTINE GYNECOLOGICAL EXAMINATION: Primary | ICD-10-CM

## 2022-02-07 DIAGNOSIS — Z12.31 ENCOUNTER FOR SCREENING MAMMOGRAM FOR MALIGNANT NEOPLASM OF BREAST: ICD-10-CM

## 2022-02-07 PROCEDURE — G0476 HPV COMBO ASSAY CA SCREEN: HCPCS | Performed by: OBSTETRICS & GYNECOLOGY

## 2022-02-07 PROCEDURE — G0145 SCR C/V CYTO,THINLAYER,RESCR: HCPCS | Performed by: OBSTETRICS & GYNECOLOGY

## 2022-02-07 PROCEDURE — S0612 ANNUAL GYNECOLOGICAL EXAMINA: HCPCS | Performed by: OBSTETRICS & GYNECOLOGY

## 2022-02-07 RX ORDER — OMEPRAZOLE 10 MG/1
10 CAPSULE, DELAYED RELEASE ORAL DAILY
COMMUNITY

## 2022-02-07 NOTE — PROGRESS NOTES
Assessment/Plan:    The patient was informed of a stable menopausal gyn examination  There is some atrophic changes of the vagina  This is not interfering with intimacy  Patient has found lubricant that she likes  She does feel safe at home  She is not happy with her weight she would like to lose 5 lb  She has completed her COVID vaccinations  Colonoscopies up-to-date  She should return my office in 1 year  A Pap smear was performed today this will probably her last Pap smear  Subjective:      Patient ID: Kirstie Padron is a 63 y o  female  HPI    This is a 62-year-old white female, she is a  4 para she denies any major  GI complaint except for occasional diarrhea and reflux  Menopause symptoms are under control so for vaginal dryness  She feels safe at home  She has a dentist on a regular basis  Mammograms up-to-date  There are no new major family illnesses report this time  She denies any problem depression or anxiety  She will need a Pap smear today  The following portions of the patient's history were reviewed and updated as appropriate: allergies, current medications, past family history, past medical history, past social history, past surgical history and problem list     Review of Systems   Genitourinary:        Ibs   All other systems reviewed and are negative  Objective:      /70   Ht 5' 6 5" (1 689 m)   Wt 62 1 kg (137 lb)   BMI 21 78 kg/m²          Physical Exam  Vitals reviewed  Exam conducted with a chaperone present  Constitutional:       Appearance: Normal appearance  She is normal weight  HENT:      Head: Normocephalic and atraumatic  Eyes:      Extraocular Movements: Extraocular movements intact  Cardiovascular:      Rate and Rhythm: Regular rhythm  Pulses: Normal pulses  Heart sounds: Normal heart sounds  Pulmonary:      Effort: Pulmonary effort is normal       Breath sounds: Normal breath sounds     Chest:   Breasts: Breasts are symmetrical       Right: Normal  No swelling, bleeding, inverted nipple, mass, nipple discharge, skin change or tenderness  Left: Normal  No swelling, bleeding, inverted nipple, mass, nipple discharge, skin change or tenderness  Abdominal:      General: Abdomen is flat  Bowel sounds are normal  There is no distension  Palpations: Abdomen is soft  There is no hepatomegaly, splenomegaly or mass  Tenderness: There is no abdominal tenderness  There is no guarding or rebound  Hernia: No hernia is present  There is no hernia in the umbilical area, ventral area, left inguinal area or right inguinal area  Genitourinary:     General: Normal vulva  Pubic Area: No rash or pubic lice  Labia:         Right: No rash, tenderness, lesion or injury  Left: No rash, tenderness, lesion or injury  Urethra: No prolapse, urethral pain, urethral swelling or urethral lesion  Vagina: Normal  No signs of injury and foreign body  No vaginal discharge, erythema, tenderness, bleeding, lesions or prolapsed vaginal walls  Cervix: No cervical motion tenderness, discharge, friability, lesion, erythema, cervical bleeding or eversion  Uterus: Normal  Not deviated, not enlarged, not fixed, not tender and no uterine prolapse  Adnexa: Right adnexa normal         Right: No mass, tenderness or fullness  Left: No mass, tenderness or fullness  Rectum: Normal       Comments: The external genitalia normal limits the vagina is clean the cervix is closed uterus is small mobile nontender the adnexa clear bilaterally  A Pap smear was performed  There was no evidence of prolapse  There is no cervical motion tenderness  The urethra bladder normal appearance  Musculoskeletal:         General: Normal range of motion  Cervical back: Normal range of motion and neck supple  Lymphadenopathy:      Lower Body: No right inguinal adenopathy   No left inguinal adenopathy  Skin:     General: Skin is warm and dry  Coloration: Skin is not jaundiced or pale  Findings: No bruising or rash  Neurological:      General: No focal deficit present  Mental Status: She is alert and oriented to person, place, and time  Psychiatric:         Mood and Affect: Mood normal          Behavior: Behavior normal          Thought Content:  Thought content normal

## 2022-02-07 NOTE — PATIENT INSTRUCTIONS
The patient was informed of a stable menopausal gyn examination  She will continue to get yearly mammograms  Colonoscopies are up-to-date  She will continue to monitor her weight  She should return to my office in 1 year unless new problems arise

## 2022-02-08 LAB
HPV HR 12 DNA CVX QL NAA+PROBE: NEGATIVE
HPV16 DNA CVX QL NAA+PROBE: NEGATIVE
HPV18 DNA CVX QL NAA+PROBE: NEGATIVE

## 2022-02-14 LAB
LAB AP GYN PRIMARY INTERPRETATION: NORMAL
Lab: NORMAL

## 2022-03-21 DIAGNOSIS — K58.0 IRRITABLE BOWEL SYNDROME WITH DIARRHEA: ICD-10-CM

## 2022-03-22 RX ORDER — DICYCLOMINE HYDROCHLORIDE 10 MG/1
CAPSULE ORAL
Qty: 360 CAPSULE | Refills: 3 | OUTPATIENT
Start: 2022-03-22

## 2022-03-28 ENCOUNTER — OFFICE VISIT (OUTPATIENT)
Dept: OBGYN CLINIC | Facility: CLINIC | Age: 62
End: 2022-03-28
Payer: COMMERCIAL

## 2022-03-28 VITALS
SYSTOLIC BLOOD PRESSURE: 128 MMHG | HEIGHT: 67 IN | WEIGHT: 135 LBS | BODY MASS INDEX: 21.19 KG/M2 | DIASTOLIC BLOOD PRESSURE: 78 MMHG

## 2022-03-28 DIAGNOSIS — M81.0 OSTEOPOROSIS, UNSPECIFIED OSTEOPOROSIS TYPE, UNSPECIFIED PATHOLOGICAL FRACTURE PRESENCE: Primary | ICD-10-CM

## 2022-03-28 PROCEDURE — 99213 OFFICE O/P EST LOW 20 MIN: CPT | Performed by: OBSTETRICS & GYNECOLOGY

## 2022-03-28 NOTE — PATIENT INSTRUCTIONS
The patient was informed that she should prior see a rheumatologist   She might need to be a medication such as Prolia  She will keep me informed her progress

## 2022-03-28 NOTE — PROGRESS NOTES
This is a 28-year-old white female she is a  3 para 3  She is menopausal   She made an appointment today to discuss results of a recent DEXA scan that shows she has osteoporosis  She has already taken calcium she is already doing weight-bearing exercises  Her laboratory work is 5 with a normal calcium level and a normal CMP  She is accompanied by her   Her question was why within this found earlier  I explained to the patient without delivery clean screening until 61years of age  We do start screening early if is a positive family history which she does not have  She told to make an appointment to see rheumatologist   She will keep me informed her progress  It should be noted that the DEXA scan was not ordered by this office

## 2022-05-12 ENCOUNTER — NON-APPOINTMENT (OUTPATIENT)
Age: 62
End: 2022-05-12

## 2022-05-16 ENCOUNTER — APPOINTMENT (OUTPATIENT)
Dept: ORTHOPEDIC SURGERY | Facility: CLINIC | Age: 62
End: 2022-05-16

## 2022-05-16 PROBLEM — Z00.00 ENCOUNTER FOR PREVENTIVE HEALTH EXAMINATION: Status: ACTIVE | Noted: 2022-05-16

## 2022-05-23 ENCOUNTER — HOSPITAL ENCOUNTER (OUTPATIENT)
Dept: RADIOLOGY | Age: 62
Discharge: HOME | End: 2022-05-23
Payer: COMMERCIAL

## 2022-05-23 VITALS — WEIGHT: 133 LBS | BODY MASS INDEX: 20.88 KG/M2 | HEIGHT: 67 IN

## 2022-05-23 DIAGNOSIS — Z12.31 ENCOUNTER FOR SCREENING MAMMOGRAM FOR MALIGNANT NEOPLASM OF BREAST: ICD-10-CM

## 2022-05-23 PROCEDURE — 77063 BREAST TOMOSYNTHESIS BI: CPT

## 2022-05-23 PROCEDURE — 77067 SCR MAMMO BI INCL CAD: CPT

## 2022-06-08 ENCOUNTER — TELEPHONE (OUTPATIENT)
Dept: OBGYN CLINIC | Facility: CLINIC | Age: 62
End: 2022-06-08

## 2022-06-20 DIAGNOSIS — N34.2 INFECTIVE URETHRITIS: ICD-10-CM

## 2022-06-22 RX ORDER — NITROFURANTOIN MACROCRYSTALS 50 MG/1
CAPSULE ORAL
Qty: 30 CAPSULE | Refills: 2 | Status: SHIPPED | OUTPATIENT
Start: 2022-06-22

## 2022-06-22 NOTE — TELEPHONE ENCOUNTER
Pt here for yearly 2/7/2022 - req rf Macrodantin 50 mg (takes post-intercourse)  If agree, please sign off on presc for same to [facility])

## 2023-02-07 ENCOUNTER — ANNUAL EXAM (OUTPATIENT)
Dept: OBGYN CLINIC | Facility: CLINIC | Age: 63
End: 2023-02-07

## 2023-02-07 VITALS
SYSTOLIC BLOOD PRESSURE: 100 MMHG | BODY MASS INDEX: 21.94 KG/M2 | HEIGHT: 67 IN | WEIGHT: 139.8 LBS | DIASTOLIC BLOOD PRESSURE: 70 MMHG

## 2023-02-07 DIAGNOSIS — Z12.31 ENCOUNTER FOR SCREENING MAMMOGRAM FOR MALIGNANT NEOPLASM OF BREAST: ICD-10-CM

## 2023-02-07 DIAGNOSIS — Z01.419 WOMEN'S ANNUAL ROUTINE GYNECOLOGICAL EXAMINATION: Primary | ICD-10-CM

## 2023-02-07 RX ORDER — ALENDRONATE SODIUM 70 MG/1
TABLET ORAL
COMMUNITY

## 2023-02-07 NOTE — PROGRESS NOTES
Assessment/Plan:    Patient was informed of a stable menopausal GYN examination  She is being treated for osteoporosis with Fosamax and soon to be other medication  Infusions or injections  There is no problem with intimacy  She was not happy with her weight she was reassured  A Pap smear was not performed  She will continue to get yearly mammograms  Colonoscopies are up-to-date  She feels safe at home  Her anxiety is under control  She should return to my office in 1 year          Subjective:      Patient ID: Edith Hdez is a 63 y o  female  HPI    This is a 61-year-old white female, she is a  3 para 3 with 3 prior vaginal deliveries  She is now menopausal   She is still sexually active  Complained that her libido is not what it used to be  Also complaining of increasing pelvic pressure  She is being evaluation by the pelvic restoration facility in our community  She does have a history of osteoporosis currently she is on Fosamax  They will think about adding another infusion product soon  She sees a dentist on a regular basis  She does have a history of IBS currently on Bentyl  She needs to make arrangement for colonoscopy soon  Continue get yearly mammogram   Still has some anxiety which is under control with Xanax  She is not happy with her weight she was reassured that her BMI is still under 23  There are no new major family illnesses to report at this time  She does have a new baby granddaughter who is 7 months old  The following portions of the patient's history were reviewed and updated as appropriate: allergies, current medications, past family history, past medical history, past social history, past surgical history and problem list     Review of Systems   All other systems reviewed and are negative  Objective:      /70   Ht 5' 6 5" (1 689 m)   Wt 63 4 kg (139 lb 12 8 oz)   BMI 22 23 kg/m²          Physical Exam  Vitals reviewed   Exam conducted with a chaperone present  Constitutional:       Appearance: Normal appearance  She is normal weight  HENT:      Head: Normocephalic and atraumatic  Right Ear: There is no impacted cerumen  Nose: Nose normal       Mouth/Throat:      Mouth: Mucous membranes are moist    Eyes:      Extraocular Movements: Extraocular movements intact  Pupils: Pupils are equal, round, and reactive to light  Cardiovascular:      Rate and Rhythm: Normal rate and regular rhythm  Pulses: Normal pulses  Heart sounds: Normal heart sounds  Pulmonary:      Effort: Pulmonary effort is normal       Breath sounds: Normal breath sounds  Chest:   Breasts:     Breasts are symmetrical       Right: Normal  No swelling, bleeding, inverted nipple, mass, nipple discharge or skin change  Left: Normal  No swelling, bleeding, inverted nipple, mass, nipple discharge or skin change  Abdominal:      General: Abdomen is flat  Bowel sounds are normal  There is no distension  Palpations: Abdomen is soft  There is no splenomegaly or mass  Tenderness: There is no abdominal tenderness  There is no guarding or rebound  Hernia: No hernia is present  There is no hernia in the left inguinal area or right inguinal area  Genitourinary:     General: Normal vulva  Pubic Area: No rash or pubic lice  Labia:         Right: No rash, tenderness, lesion or injury  Left: No rash, tenderness, lesion or injury  Urethra: No prolapse, urethral pain, urethral swelling or urethral lesion  Vagina: Normal  No signs of injury and foreign body  No vaginal discharge, erythema, tenderness, bleeding, lesions or prolapsed vaginal walls  Cervix: Normal       Uterus: Normal  Not deviated, not enlarged, not fixed, not tender and no uterine prolapse  Adnexa: Right adnexa normal and left adnexa normal         Right: No mass, tenderness or fullness  Left: No mass, tenderness or fullness  Rectum: Normal       Comments: The external genitalia normal limits the vagina is clean the uterus is small mobile nontender adnexa clear bilaterally  A Pap smear was not performed  There is no evidence of prolapse  Urethra and bladder normal working relationship  Musculoskeletal:         General: Normal range of motion  Cervical back: Normal range of motion and neck supple  Lymphadenopathy:      Upper Body:      Right upper body: No supraclavicular or axillary adenopathy  Left upper body: No supraclavicular or axillary adenopathy  Lower Body: No right inguinal adenopathy  No left inguinal adenopathy  Skin:     General: Skin is warm and dry  Neurological:      General: No focal deficit present  Mental Status: She is alert and oriented to person, place, and time     Psychiatric:         Mood and Affect: Mood normal          Behavior: Behavior normal

## 2023-02-07 NOTE — PATIENT INSTRUCTIONS
Patient was informed of a stable menopausal GYN examination  She has a history of osteoporosis we will continue with her rheumatologist for evaluation and therapy  She will continue to get yearly mammograms  Colonoscopy is up-to-date she should return to my office in 1 year unless new issues occur

## 2023-02-13 ENCOUNTER — TELEPHONE (OUTPATIENT)
Dept: OTHER | Facility: OTHER | Age: 63
End: 2023-02-13

## 2023-02-13 ENCOUNTER — TELEPHONE (OUTPATIENT)
Dept: CARDIOLOGY CLINIC | Facility: CLINIC | Age: 63
End: 2023-02-13

## 2023-02-13 DIAGNOSIS — K58.0 IRRITABLE BOWEL SYNDROME WITH DIARRHEA: ICD-10-CM

## 2023-02-13 NOTE — TELEPHONE ENCOUNTER
Pt left a v/m that she is starting [employer] and it has some side effects  She would like a call back to discuss

## 2023-02-13 NOTE — TELEPHONE ENCOUNTER
Patient calling stating she needs an appointment with Dr Sukhdeep Carrizales as she has not been seen since 03/11/2021 and is requesting a refill on Bentyl 10 mg 1 capsule po q6h prn     Please give patient a call for scheduling, would like to see Dr Sukhdeep Carrizales in [place]

## 2023-02-14 RX ORDER — DICYCLOMINE HYDROCHLORIDE 10 MG/1
10 CAPSULE ORAL EVERY 6 HOURS PRN
Qty: 360 CAPSULE | Refills: 3 | Status: SHIPPED | OUTPATIENT
Start: 2023-02-14

## 2023-02-20 NOTE — TELEPHONE ENCOUNTER
She would be ok to start it  She has not had a heart attack though I know a family history of this  We can review further at her visit

## 2023-03-14 ENCOUNTER — OFFICE VISIT (OUTPATIENT)
Dept: CARDIOLOGY CLINIC | Facility: CLINIC | Age: 63
End: 2023-03-14

## 2023-03-14 VITALS
DIASTOLIC BLOOD PRESSURE: 68 MMHG | SYSTOLIC BLOOD PRESSURE: 110 MMHG | BODY MASS INDEX: 21.86 KG/M2 | HEART RATE: 55 BPM | HEIGHT: 66 IN | OXYGEN SATURATION: 98 % | WEIGHT: 136 LBS

## 2023-03-14 DIAGNOSIS — I49.1 PAC (PREMATURE ATRIAL CONTRACTION): ICD-10-CM

## 2023-03-14 DIAGNOSIS — R00.2 PALPITATIONS: Primary | ICD-10-CM

## 2023-03-14 DIAGNOSIS — R00.1 SINUS BRADYCARDIA: ICD-10-CM

## 2023-03-14 DIAGNOSIS — Z82.49 FAMILY HISTORY OF EARLY CAD: ICD-10-CM

## 2023-03-14 DIAGNOSIS — E78.5 DYSLIPIDEMIA: ICD-10-CM

## 2023-03-14 RX ORDER — NAPROXEN 500 MG/1
TABLET ORAL
COMMUNITY

## 2023-03-14 RX ORDER — ROMOSOZUMAB-AQQG 105 MG/1.17ML
INJECTION, SOLUTION SUBCUTANEOUS
COMMUNITY
Start: 2023-02-28

## 2023-03-14 RX ORDER — HYALURONATE SODIUM 20 MG/2 ML
2 SYRINGE (ML) INTRAARTICULAR
COMMUNITY

## 2023-03-14 RX ORDER — SODIUM FLUORIDE 6 MG/ML
PASTE, DENTIFRICE DENTAL
COMMUNITY
Start: 2023-02-25

## 2023-03-14 NOTE — PROGRESS NOTES
Cardiology Follow Up    Erich Lazcano  [id number]  1960  [facility]ART [facility] VASCULAR [facility] [place]  [address] [facility] [address]    1  Palpitations  POCT ECG      2  Sinus bradycardia  POCT ECG      3  PAC (premature atrial contraction)  POCT ECG      4  Family history of early CAD  CT coronary calcium score      5  Dyslipidemia  CT coronary calcium score          Discussion/Summary:    Strong family history of CAD  We did a vascular screen in 2019 which was unremarkable  She has a healthy diet  However, last 2 lipid panels done in care everywhere show that her LDL has increased  Recommended further restratification with a calcium score which she has discussed previously and is interested in  PACs previously noted on Holter monitor  Heart rate tends to be on the slower side at baseline  Rare palpitations so we will continue with conservative management  Continue with good healthy lifestyle and diet  If calcium score elevated, will start statin as appropriate  Previous History:  Strong family history of CAD    Three brothers, 2 with heart attacks in the 46s, 1 who  in his 46s presumed to be from a heart attack  Her father had an MI and cardiomyopathy in his 62s as well  She actually saw cardiologist previously when her other brother had coronary disease diagnosed  She had with a nuclear stress test and echocardiogram done in   Both of these were unremarkable  Blood pressure is typically low (no medication)  Cholesterol was well controlled without any medical therapy  She eats healthy diet  She has IBS and various allergies, therefore sticks to a very well controlled diet overall  Previously with PACs noted on Holter monitor  Interval History:   Returns overdue for follow-up   She recently started on a new medication for osteoporosis and she had concerns because there are risks with cardiovascular health  She been doing well  No symptoms  She cannot run anymore because she has process fracture, but she is doing walking  Still denies any cardiac symptoms  Heart rate continues to be on the lower side but she is asymptomatic  Occasional palpitations, but not bothersome to her  Since last seeing me, she had 2 lipid panels which showed that the LDL has increased  Her HDL continues to be favorable was 60 and then most recently 54  However, the LDL is now in the 140s  Her diet has not changed  10-year ASCVD risk remains low less than 3%      Patient Active Problem List   Diagnosis   • Screening for colon cancer   • Irritable bowel syndrome with both constipation and diarrhea   • Dyslipidemia     Past Medical History:   Diagnosis Date   • Basal cell carcinoma (BCC) in situ of skin    • Chiari syndrome (HCC)    • IBS (irritable bowel syndrome)      Social History     Tobacco Use   • Smoking status: Never   • Smokeless tobacco: Never   Vaping Use   • Vaping Use: Never used   Substance Use Topics   • Alcohol use: Yes     Comment: occasional   • Drug use: Yes     Types: Marijuana     Comment: occasional      Family History   Problem Relation Age of Onset   • Heart disease Father    • Other Father         Myocardial Ischemia   • Coronary artery disease Family    • Emphysema Other    • Heart attack Other    • Heart disease Other    • Hyperlipidemia Other    • Breast cancer Maternal Grandmother 60   • Heart attack Brother    • No Known Problems Mother    • No Known Problems Daughter    • No Known Problems Maternal Grandfather    • No Known Problems Paternal Grandmother    • No Known Problems Paternal Grandfather      Past Surgical History:   Procedure Laterality Date   • BREAST BIOPSY Right 03/23/2006    core benign   • COLONOSCOPY      every 5 years   • KNEE ARTHROSCOPY Left    • NASAL SEPTUM SURGERY     • WA COLONOSCOPY FLX DX W/COLLJ SPEC WHEN PFRMD N/A 10/18/2018    Procedure: COLONOSCOPY;  Surgeon: Lynn Stratton DO Rosie;  Location: [facility]; Service: Gastroenterology   • UPPER GASTROINTESTINAL ENDOSCOPY     • VARICOSE VEIN SURGERY Right        Current Outpatient Medications:   •  ALPRAZolam (XANAX) 0 25 mg tablet, TK 1 T PO Q 8 H PRA, Disp: , Rfl: 0  •  Calcium Carbonate-Vit D-Min (CALCIUM 1200 PO), Take by mouth, Disp: , Rfl:   •  cholecalciferol (VITAMIN D3) 1,000 units tablet, Take 500 Units by mouth daily, Disp: , Rfl:   •  dicyclomine (BENTYL) 10 mg capsule, Take 1 capsule (10 mg total) by mouth every 6 (six) hours as needed (abd pain), Disp: 360 capsule, Rfl: 3  •  loperamide (IMODIUM A-D) 2 MG tablet, Take 1 5 tablets (3 mg total) by mouth 4 (four) times a day as needed for diarrhea, Disp: 360 tablet, Rfl: 3  •  magnesium 30 MG tablet, Take 30 mg by mouth daily , Disp: , Rfl:   •  Multiple Vitamins-Minerals (MULTIVITAMIN WITH MINERALS) tablet, Take 1 tablet by mouth daily, Disp: , Rfl:   •  nitrofurantoin (MACRODANTIN) 50 mg capsule, TAKE 1 CAPSULE BY MOUTH AFTER INTERCOURSE, Disp: 30 capsule, Rfl: 2  •  PreviDent 5000 Booster Plus 1 1 % PSTE, BRUSH NORMALLY AND EXPECTORATE BUT DO NOT RINSE AFTER   DO NOT EAT OR DRINK ANYTHING FOR AT LEAST 30 MINUTES AFTER USE, Disp: , Rfl:   •  romosozumab-aqqg (Evenity) subcutaneous injection, Inject under the skin Every month, Disp: , Rfl:   •  Sodium Hyaluronate (Euflexxa) 20 MG/2ML SOSY, 2 mL, Disp: , Rfl:   •  FIBER ADULT GUMMIES PO, Take by mouth daily (Patient not taking: Reported on 2/7/2023), Disp: , Rfl:   •  naproxen (NAPROSYN) 500 mg tablet, naproxen 500 mg tablet  TAKE 1 TABLET BY MOUTH TWICE DAILY WITH MEALS (Patient not taking: Reported on 3/14/2023), Disp: , Rfl:   Allergies   Allergen Reactions   • Egg Phosphatides-Glycerin-Soybean Oil  [Fat Emulsion Plant Based (Soy)] Abdominal Pain   • Gluten Meal - Food Allergy Abdominal Pain   • Lactose - Food Allergy Abdominal Pain   • Azithromycin Other (See Comments)     Stomach cramping   • Eggs Or Egg-Derived Products - Food Allergy    • Erythromycin    • Erythromycin Base    • Keflex [Cephalexin] Other (See Comments)     Severe stomach cramping   • Milk-Related Compounds - Food Allergy        Vitals:    03/14/23 1551   BP: 110/68   BP Location: Left arm   Patient Position: Sitting   Cuff Size: Standard   Pulse: 55   SpO2: 98%   Weight: 61 7 kg (136 lb)   Height: 5' 6" (1 676 m)     Vitals:    03/14/23 1551   Weight: 61 7 kg (136 lb)      Height: 5' 6" (167 6 cm)   Body mass index is 21 95 kg/m²  Physical Exam:  GEN: Sylvie Burgos appears well, alert and oriented x 3, pleasant and cooperative   HEENT: pupils equal, round, and reactive to light; extraocular muscles intact  NECK: supple, no carotid bruits   HEART: Bradycardic, regular rhythm, normal S1 and S2, no murmurs, clicks, gallops or rubs   LUNGS: clear to auscultation bilaterally; no wheezes, rales, or rhonchi   ABDOMEN: normal bowel sounds, soft, no tenderness, no distention  EXTREMITIES: peripheral pulses normal; no clubbing, cyanosis, or edema  NEURO: no focal findings   SKIN: normal without suspicious lesions on exposed skin    ROS:  Positive for IBS, reflux  Except as noted in HPI, is otherwise reviewed in detail and a 12 point review of systems is negative    ROS reviewed and is unchanged    Labs:  Lab Results   Component Value Date     04/28/2014    K 3 9 02/24/2021     02/24/2021    CREATININE 0 78 02/24/2021    BUN 17 02/24/2021    CO2 28 02/24/2021    ALT 31 02/24/2021    AST 21 02/24/2021    GLUF 92 02/24/2021    HGBA1C 5 7 (H) 02/04/2022    WBC 5 31 02/24/2021    HGB 13 3 02/24/2021    HCT 42 0 02/24/2021     02/24/2021     Lab Results   Component Value Date    CHOL 210 04/21/2014    CHOL 204 03/03/2014     Lab Results   Component Value Date    HDL 65 02/24/2021    HDL 58 04/30/2019    HDL 76 (H) 12/11/2017     Lab Results   Component Value Date    LDLCALC 108 (H) 02/24/2021    LDLCALC 94 04/30/2019    LDLCALC 122 (H) 12/11/2017 Lab Results   Component Value Date    TRIG 139 02/24/2021    TRIG 184 (H) 04/30/2019    TRIG 110 12/11/2017   Vas Screen 6/2019  Impression  CAROTID SCREENING:  Evaluation reveals a normal internal carotid artery on the right  Evaluation reveals a normal internal carotid artery on the left  AORTA SCREENING:  The abdominal aorta is patent and normal in caliber with the greatest diameter  measurement of 1 73cms  PAD SCREENING:  The ankle/brachial index on the right is 1 29 , which is within normal limits  The ankle/brachial index on the left is  1 34, which is within normal limits  Stress Test 5/28/19:  STRESS SUMMARY: Duration of exercise was 11 min and 0 sec  The patient exercised to protocol stage 4  Maximal work rate was 13 4 METs  Functional capacity was excellent  Maximal heart rate during stress was 169 bpm ( 103 % of maximal  predicted heart rate)  The heart rate response to stress was normal  There was normal resting blood pressure with an appropriate response to stress  Pre 02 sat 100%  Post 02 sat 98%  The rate-pressure product for the peak heart rate and  blood pressure was 63200  There was no chest pain during stress  The stress test was terminated due to achievement of maximal (symptom limited) exercise and achievement of target heart rate  The stress ECG was negative for ischemia  although limited by artifact at peak exercise  There were no stress arrhythmias or conduction abnormalities      SUMMARY:  -  Stress results: Duration of exercise was 11 min and 0 sec  Functional capacity was excellent  Target heart rate was achieved  There was no chest pain during stress  -  ECG conclusions: The stress ECG was negative for ischemia although limited by artifact at peak exercise      IMPRESSIONS: No evidence of ischemia after maximal exercise without reproduction of symptoms  Holter:  Impression:   Overall borderline hrs of holter monitoring  Predominant rhythm was normal sinus rhythm    Normal diurnal variation of heart rate  Overall low density of supraventricular ectopy during the monitored period  One short burst of atrial tachycardia was noted  There was no ventricular ectopy during the monitored period  Palpitations correlated with normal sinus rhythm or sinus tachycardia      EKG:  Sinus bradycardia, 52 BPM  Otherwise normal EKG

## 2023-03-22 ENCOUNTER — OFFICE VISIT (OUTPATIENT)
Dept: GASTROENTEROLOGY | Facility: CLINIC | Age: 63
End: 2023-03-22

## 2023-03-22 VITALS
WEIGHT: 138 LBS | BODY MASS INDEX: 22.18 KG/M2 | HEIGHT: 66 IN | DIASTOLIC BLOOD PRESSURE: 66 MMHG | SYSTOLIC BLOOD PRESSURE: 118 MMHG | TEMPERATURE: 97.5 F

## 2023-03-22 DIAGNOSIS — Z12.11 COLON CANCER SCREENING: ICD-10-CM

## 2023-03-22 DIAGNOSIS — K58.0 IRRITABLE BOWEL SYNDROME WITH DIARRHEA: Primary | ICD-10-CM

## 2023-03-22 DIAGNOSIS — R10.13 DYSPEPSIA: ICD-10-CM

## 2023-03-22 NOTE — PROGRESS NOTES
[facility] - Outpatient Follow-up Note  Deb Guevara 63 y o  female MRN: [id number]  Encounter: [id number]          ASSESSMENT AND PLAN:  63year old female here for follow up  1  Irritable bowel syndrome with diarrhea  -trial of xifaximin    2  Colon cancer screening  - last colonoscopy in 2018, no family history of colon cancer, repeat colonoscopy in 2028    3  Dyspepsia      Follow up 4-6 months     _________________________________________________________    SUBJECTIVE:  Pt here for follow up of IBS-D  Having more unpredictable symptoms  Does avoid certain foods especially her food triggers  Also with some urgency at times  Interfering with daily life  She is using bentyl as needed as well as imodium  Is also using IBgard which helps  If she is rushed in the am to get ready it's more problematic  This year was diagnosed with osteoporosis and she is now on medication for this  Has tried the low fodmap diet which has helped some and avoids her food triggers  No alarm symptoms  REVIEW OF SYSTEMS IS OTHERWISE NEGATIVE  Historical Information   Past Medical History:   Diagnosis Date   • Basal cell carcinoma (BCC) in situ of skin    • Chiari syndrome (HCC)    • Foot fracture    • IBS (irritable bowel syndrome)    • Osteoporosis    • Stress fracture of lower leg      Past Surgical History:   Procedure Laterality Date   • BREAST BIOPSY Right 03/23/2006    core benign   • COLONOSCOPY      every 5 years   • KNEE ARTHROSCOPY Left    • NASAL SEPTUM SURGERY     • [place] COLONOSCOPY FLX DX W/COLLJ SPEC WHEN PFRMD N/A 10/18/2018    Procedure: COLONOSCOPY;  Surgeon: Zeferino Sauceda DO;  Location: [facility];   Service: Gastroenterology   • UPPER GASTROINTESTINAL ENDOSCOPY     • VARICOSE VEIN SURGERY Right      Social History   Social History     Substance and Sexual Activity   Alcohol Use Yes    Comment: occasional     Social History     Substance and Sexual Activity   Drug Use Yes   • Types: Marijuana    Comment: occasional     Social History     Tobacco Use   Smoking Status Never   Smokeless Tobacco Never     Family History   Problem Relation Age of Onset   • Heart disease Father    • Other Father         Myocardial Ischemia   • Coronary artery disease Family    • Emphysema Other    • Heart attack Other    • Heart disease Other    • Hyperlipidemia Other    • Breast cancer Maternal Grandmother 60   • Heart attack Brother    • No Known Problems Mother    • No Known Problems Daughter    • No Known Problems Maternal Grandfather    • No Known Problems Paternal Grandmother    • No Known Problems Paternal Grandfather        Meds/Allergies       Current Outpatient Medications:   •  ALPRAZolam (XANAX) 0 25 mg tablet  •  Calcium Carbonate-Vit D-Min (CALCIUM 1200 PO)  •  cholecalciferol (VITAMIN D3) 1,000 units tablet  •  dicyclomine (BENTYL) 10 mg capsule  •  loperamide (IMODIUM A-D) 2 MG tablet  •  magnesium 30 MG tablet  •  Multiple Vitamins-Minerals (MULTIVITAMIN WITH MINERALS) tablet  •  nitrofurantoin (MACRODANTIN) 50 mg capsule  •  PreviDent 5000 Booster Plus 1 1 % PSTE  •  romosozumab-aqqg (Evenity) subcutaneous injection  •  FIBER ADULT GUMMIES PO  •  naproxen (NAPROSYN) 500 mg tablet  •  Sodium Hyaluronate (Euflexxa) 20 MG/2ML SOSY    Allergies   Allergen Reactions   • Egg Phosphatides-Glycerin-Soybean Oil  [Fat Emulsion Plant Based (Soy)] Abdominal Pain   • Gluten Meal - Food Allergy Abdominal Pain   • Lactose - Food Allergy Abdominal Pain   • Azithromycin Other (See Comments)     Stomach cramping   • Eggs Or Egg-Derived Products - Food Allergy    • Erythromycin    • Erythromycin Base    • Keflex [Cephalexin] Other (See Comments)     Severe stomach cramping   • Milk-Related Compounds - Food Allergy            Objective     Blood pressure 118/66, temperature 97 5 °F (36 4 °C), temperature source Tympanic, height 5' 6" (1 676 m), weight 62 6 kg (138 lb), not currently breastfeeding  Body mass index is 22 27 kg/m²  PHYSICAL EXAM:      General Appearance:   Alert, cooperative, no distress   HEENT:   Normocephalic, atraumatic, anicteric      Neck:  Supple, symmetrical, trachea midline   Lungs:   Clear to auscultation bilaterally; no rales, rhonchi or wheezing; respirations unlabored    Heart[de-identified]   Regular rate and rhythm; no murmur, rub, or gallop  Abdomen:   Soft, non-tender, non-distended; normal bowel sounds; no masses, no organomegaly    Genitalia:   Deferred    Rectal:   Deferred    Extremities:  No cyanosis, clubbing or edema    Pulses:  2+ and symmetric    Skin:  No jaundice, rashes, or lesions    Lymph nodes:  No palpable cervical lymphadenopathy        Lab Results:   No visits with results within 1 Day(s) from this visit  Latest known visit with results is:   Annual Exam on 02/07/2022   Component Date Value   • Case Report 02/07/2022                      Value:Gynecologic Cytology Report                       Case: [id number]                                  Authorizing Provider:  Chayo Dunbar MD         Collected:           02/07/2022 1148              Ordering Location:     [facility]      Received:            02/07/2022 1148              First Screen:          Winifred Jones, CT                                                    Specimen:    LIQUID-BASED PAP, SCREENING, Cervix                                                       • Primary Interpretation 02/07/2022 Negative for intraepithelial lesion or malignancy    • Specimen Adequacy 02/07/2022 Satisfactory for evaluation  Endocervical/transformation zone component present  • Additional Information 02/07/2022                      Value: This result contains rich text formatting which cannot be displayed here     • LMP 02/07/2022     • HPV Other HR 02/07/2022 Negative    • HPV16 02/07/2022 Negative    • HPV18 02/07/2022 Negative          Radiology Results:   No results found   Answers for HPI/ROS submitted by the patient on 3/21/2023  Chronicity: chronic  Onset: more than 1 year ago  Onset quality: undetermined  Frequency: every several days  Progression since onset: waxing and waning  Pain location: suprapubic region  Pain - numeric: 4/10  Pain quality: cramping  Radiates to: suprapubic region  anorexia: Yes  arthralgias: No  belching: No  constipation: No  diarrhea: Yes  dysuria: No  fever: No  flatus: No  frequency: No  headaches: No  hematochezia: No  hematuria: No  melena: No  myalgias: No  nausea: Yes  weight loss: No  Aggravated by: bowel movement, eating  Relieved by: bowel movements, liquids, passing flatus

## 2023-03-29 ENCOUNTER — TELEPHONE (OUTPATIENT)
Dept: PSYCHIATRY | Facility: CLINIC | Age: 63
End: 2023-03-29

## 2023-03-29 NOTE — TELEPHONE ENCOUNTER
A message was left for patient to return call to inake dept  Pt will be added to wait list at that time

## 2023-03-30 ENCOUNTER — HOSPITAL ENCOUNTER (OUTPATIENT)
Dept: CT IMAGING | Facility: HOSPITAL | Age: 63
Discharge: HOME | End: 2023-03-30
Attending: INTERNAL MEDICINE

## 2023-03-30 DIAGNOSIS — E78.5 DYSLIPIDEMIA: ICD-10-CM

## 2023-03-30 DIAGNOSIS — Z82.49 FAMILY HISTORY OF EARLY CAD: ICD-10-CM

## 2023-04-03 DIAGNOSIS — R93.1 ELEVATED CORONARY ARTERY CALCIUM SCORE: Primary | ICD-10-CM

## 2023-04-03 RX ORDER — ROSUVASTATIN CALCIUM 5 MG/1
5 TABLET, COATED ORAL DAILY
Qty: 90 TABLET | Refills: 3 | Status: SHIPPED | OUTPATIENT
Start: 2023-04-03

## 2023-04-21 ENCOUNTER — TELEPHONE (OUTPATIENT)
Dept: CARDIOLOGY CLINIC | Facility: CLINIC | Age: 63
End: 2023-04-21

## 2023-04-21 DIAGNOSIS — E78.5 DYSLIPIDEMIA: Primary | ICD-10-CM

## 2023-04-21 NOTE — TELEPHONE ENCOUNTER
Pt called with questions regarding the calcium score and lipid results  I answered all of her questions except she is interested in repeating a lipid panel whenever you feel appropriate  She started taking the statin yesterday   Please advise

## 2023-05-30 ENCOUNTER — HOSPITAL ENCOUNTER (OUTPATIENT)
Dept: RADIOLOGY | Age: 63
Discharge: HOME | End: 2023-05-30

## 2023-05-30 VITALS — BODY MASS INDEX: 21.66 KG/M2 | WEIGHT: 138 LBS | HEIGHT: 67 IN

## 2023-05-30 DIAGNOSIS — Z12.31 ENCOUNTER FOR SCREENING MAMMOGRAM FOR MALIGNANT NEOPLASM OF BREAST: ICD-10-CM

## 2023-06-12 ENCOUNTER — TELEPHONE (OUTPATIENT)
Dept: PSYCHIATRY | Facility: CLINIC | Age: 63
End: 2023-06-12

## 2024-02-21 PROBLEM — Z12.11 SCREENING FOR COLON CANCER: Status: RESOLVED | Noted: 2018-09-12 | Resolved: 2024-02-21

## 2024-02-26 DIAGNOSIS — K58.2 IRRITABLE BOWEL SYNDROME WITH BOTH CONSTIPATION AND DIARRHEA: ICD-10-CM

## 2024-02-26 DIAGNOSIS — K58.0 IRRITABLE BOWEL SYNDROME WITH DIARRHEA: ICD-10-CM

## 2024-02-26 RX ORDER — LOPERAMIDE HYDROCHLORIDE 2 MG/1
3 TABLET ORAL 4 TIMES DAILY PRN
Qty: 360 TABLET | Refills: 3 | Status: CANCELLED | OUTPATIENT
Start: 2024-02-26

## 2024-02-26 RX ORDER — DICYCLOMINE HYDROCHLORIDE 10 MG/1
10 CAPSULE ORAL EVERY 6 HOURS PRN
Qty: 270 CAPSULE | Refills: 1 | Status: SHIPPED | OUTPATIENT
Start: 2024-02-26

## 2024-02-26 NOTE — TELEPHONE ENCOUNTER
Reason for call:   [x] Refill   [] Prior Auth  [] Other:     Office:   [] PCP/Provider -   [] Specialty/Provider - GI    Medication: dicyclomine (BENTYL) 10 mg capsule Take 1 capsule (10 mg total) by mouth every 6 (six) hours as needed (abd pain)          Quantity: #360    Pharmacy: [id number] - [place], [place] - [address] [phone]

## 2024-03-05 ENCOUNTER — ANNUAL EXAM (OUTPATIENT)
Dept: OBGYN CLINIC | Facility: CLINIC | Age: 64
End: 2024-03-05
Payer: COMMERCIAL

## 2024-03-05 VITALS
SYSTOLIC BLOOD PRESSURE: 130 MMHG | HEIGHT: 67 IN | DIASTOLIC BLOOD PRESSURE: 70 MMHG | WEIGHT: 138 LBS | BODY MASS INDEX: 21.66 KG/M2

## 2024-03-05 DIAGNOSIS — Z12.31 ENCOUNTER FOR SCREENING MAMMOGRAM FOR BREAST CANCER: Primary | ICD-10-CM

## 2024-03-05 DIAGNOSIS — N34.2 INFECTIVE URETHRITIS: ICD-10-CM

## 2024-03-05 PROCEDURE — S0612 ANNUAL GYNECOLOGICAL EXAMINA: HCPCS | Performed by: OBSTETRICS & GYNECOLOGY

## 2024-03-05 RX ORDER — DENOSUMAB 60 MG/ML
60 INJECTION SUBCUTANEOUS ONCE
COMMUNITY

## 2024-03-05 RX ORDER — NITROFURANTOIN MACROCRYSTALS 50 MG/1
CAPSULE ORAL
Qty: 30 CAPSULE | Refills: 2 | Status: SHIPPED | OUTPATIENT
Start: 2024-03-05

## 2024-03-05 NOTE — PROGRESS NOTES
"Assessment/Plan:    The patient was informed of a stable menopausal GYN examination.  She will continue get her colonoscopies as directed.  She will continue getting yearly mammograms.  She will continue using her Crestor.  She should return to my office in 1 year.  She will continue with her Prolia.  There is no problem with intimacy.  She should return to my office in 1 year unless new issues occur.         Subjective:      Patient ID: Pamela J Magnan is a 63 y.o. female.    HPI    This is a 63-year-old white female, she is a  3 para 3 with 3 prior vaginal deliveries.  She is menopausal.  She still sexually active.  Menopause symptoms are under control.  Denies any major  or GI complaint.  Does have a history of IBS.  Colonoscopies are up-to-date.  She is currently being treated for osteoporosis.  She is now getting Prolia injections.  She feels safe at home.  She is a dentist on a regular basis.  Denies any prior depression or anxiety.  Family history is significant for male members having heart disease in her early age and dying.  She will continue to use Macrodantin after intercourse which is working well.  Refill has been authorized.  She will continue getting yearly mammograms.  She also has a history of elevated cholesterol and is on Crestor.          The following portions of the patient's history were reviewed and updated as appropriate: allergies, current medications, past family history, past medical history, past social history, past surgical history, and problem list.    Review of Systems   All other systems reviewed and are negative.        Objective:      /70   Ht 5' 7\" (1.702 m)   Wt 62.6 kg (138 lb)   BMI 21.61 kg/m²          Physical Exam  Vitals reviewed. Exam conducted with a chaperone present.   Constitutional:       Appearance: Normal appearance. She is normal weight.   HENT:      Head: Normocephalic and atraumatic.      Nose: Nose normal.      Mouth/Throat:      Mouth: " Mucous membranes are moist.   Eyes:      Extraocular Movements: Extraocular movements intact.      Pupils: Pupils are equal, round, and reactive to light.   Cardiovascular:      Rate and Rhythm: Normal rate and regular rhythm.      Pulses: Normal pulses.      Heart sounds: Normal heart sounds.   Pulmonary:      Effort: Pulmonary effort is normal.      Breath sounds: Normal breath sounds.   Chest:   Breasts:     Breasts are symmetrical.      Right: Normal.      Left: Normal.   Abdominal:      General: Abdomen is flat. Bowel sounds are normal. There is no distension.      Palpations: Abdomen is soft. There is no splenomegaly or mass.      Tenderness: There is no abdominal tenderness.      Hernia: No hernia is present. There is no hernia in the umbilical area or ventral area.   Genitourinary:     General: Normal vulva.      Pubic Area: No rash or pubic lice.       Labia:         Right: No rash, tenderness, lesion or injury.         Left: No rash, tenderness, lesion or injury.       Urethra: No prolapse, urethral pain, urethral swelling or urethral lesion.      Vagina: Normal. No signs of injury and foreign body. No vaginal discharge, erythema, tenderness, bleeding, lesions or prolapsed vaginal walls.      Cervix: Normal.      Uterus: Normal.       Adnexa: Right adnexa normal and left adnexa normal.        Right: No mass, tenderness or fullness.          Left: No mass, tenderness or fullness.        Rectum: Normal.      Comments: The external genitalia normal limits consistent with menopause.  The vagina is clean also consistent with menopause.  The cervix is closed uterus is midposition normal size.  There is no cervical motion tenderness.  There is no evidence of prolapse.  The adnexa clear bilaterally.  The recent bladder normal working relationship.  A Pap smear was not performed.  Musculoskeletal:         General: Normal range of motion.      Cervical back: Normal range of motion and neck supple.   Lymphadenopathy:       Upper Body:      Right upper body: No supraclavicular adenopathy.      Left upper body: No supraclavicular adenopathy.   Skin:     General: Skin is warm and dry.   Neurological:      General: No focal deficit present.      Mental Status: She is alert and oriented to person, place, and time.   Psychiatric:         Mood and Affect: Mood normal.         Behavior: Behavior normal.         Thought Content: Thought content normal.

## 2024-03-05 NOTE — PATIENT INSTRUCTIONS
The patient was informed of a stable menopausal GYN examination.  Exam was benign.  She will continue getting yearly mammograms.  She will continue getting her colonoscopies as directed.  She feels safe at home.  She sees a dentist on a regular basis.  There is no major  or GI complaint.  She should return to my office in 1 year unless new issues occur.

## 2024-03-07 LAB
CHOLEST SERPL-MCNC: 153 MG/DL
CHOLEST/HDLC SERPL: 2.4 {RATIO}
HDLC SERPL-MCNC: 63 MG/DL (ref 23–92)
LDLC SERPL CALC-MCNC: 66 MG/DL
NONHDLC SERPL-MCNC: 90 MG/DL
TRIGL SERPL-MCNC: 122 MG/DL

## 2024-04-09 DIAGNOSIS — R93.1 ELEVATED CORONARY ARTERY CALCIUM SCORE: ICD-10-CM

## 2024-04-09 RX ORDER — ROSUVASTATIN CALCIUM 5 MG/1
5 TABLET, COATED ORAL DAILY
Qty: 90 TABLET | Refills: 3 | Status: SHIPPED | OUTPATIENT
Start: 2024-04-09

## 2024-04-25 ENCOUNTER — OFFICE VISIT (OUTPATIENT)
Dept: CARDIOLOGY CLINIC | Facility: CLINIC | Age: 64
End: 2024-04-25
Payer: COMMERCIAL

## 2024-04-25 VITALS
BODY MASS INDEX: 21.5 KG/M2 | OXYGEN SATURATION: 97 % | WEIGHT: 137 LBS | SYSTOLIC BLOOD PRESSURE: 96 MMHG | HEIGHT: 67 IN | DIASTOLIC BLOOD PRESSURE: 62 MMHG | HEART RATE: 47 BPM | TEMPERATURE: 98.3 F

## 2024-04-25 DIAGNOSIS — I25.10 CORONARY ARTERY CALCIFICATION SEEN ON CAT SCAN: Primary | ICD-10-CM

## 2024-04-25 DIAGNOSIS — E78.5 DYSLIPIDEMIA: ICD-10-CM

## 2024-04-25 PROCEDURE — 99214 OFFICE O/P EST MOD 30 MIN: CPT | Performed by: INTERNAL MEDICINE

## 2024-04-25 PROCEDURE — 93000 ELECTROCARDIOGRAM COMPLETE: CPT | Performed by: INTERNAL MEDICINE

## 2024-04-25 RX ORDER — CALCIUM CARBONATE/VITAMIN D3 600 MG-10
TABLET ORAL
COMMUNITY
Start: 2023-03-27

## 2024-04-25 NOTE — PROGRESS NOTES
Cardiology Follow Up    Pamela J Magnan  [id number]  1960  HEART & VASCULAR [facility]  [address]  [place] [address]    1. Coronary artery calcification seen on CAT scan  POCT ECG      2. Dyslipidemia  POCT ECG          Discussion/Summary:    Strong family history of coronary artery disease. Dyslipidemia. Minimally elevated coronary artery calcium score at 1 put her in the 64th percentile. We opted to start 5 mg of Crestor.    She is doing well with this. Denies any side effects. Continue current dose of Crestor.    Continues to be asymptomatic at a good level of exercise/activity.    Blood pressure is on the lower side.    Asymptomatic sinus bradycardia. She is not on any josse blocking agents.    Continue current management and follow-up in 1 year.    Previous History:  Strong family history of CAD    Three brothers, 2 with heart attacks in the 50s, 1 who  in his 50s presumed to be from a heart attack.  Her father had an MI and cardiomyopathy in his 60s as well.    She actually saw cardiologist previously when her other brother had coronary disease diagnosed.  She had with a nuclear stress test and echocardiogram done in . Both of these were unremarkable.  Blood pressure is typically low (no medication)  Cholesterol was well controlled without any medical therapy.  She eats healthy diet.  She has IBS and various allergies, therefore sticks to a very well controlled diet overall.    Previously with PACs noted on Holter monitor.    Calcium score was 1     Interval History:    After last visit, we did a calcium score. It was minimally elevated at 1, but technically nonzero. I discussed with her and we opted to start low-dose Crestor. Her LDL has improved significantly with this. She is not having any side effects from it.    She continues to exercise regularly, denies any symptoms or limitations.    Patient Active  Problem List   Diagnosis    Irritable bowel syndrome with both constipation and diarrhea    Dyslipidemia     Past Medical History:   Diagnosis Date    Basal cell carcinoma (BCC) in situ of skin     Chiari syndrome (HCC)     Foot fracture     IBS (irritable bowel syndrome)     Osteoporosis     Stress fracture of lower leg      Social History     Tobacco Use    Smoking status: Never    Smokeless tobacco: Never   Vaping Use    Vaping status: Never Used   Substance Use Topics    Alcohol use: Yes     Comment: occasional    Drug use: Yes     Types: Marijuana     Comment: occasional      Family History   Problem Relation Age of Onset    Heart disease Father     Other Father         Myocardial Ischemia    Coronary artery disease Family     Emphysema Other     Heart attack Other     Heart disease Other     Hyperlipidemia Other     Breast cancer Maternal Grandmother 60    Heart attack Brother     No Known Problems Mother     No Known Problems Daughter     No Known Problems Maternal Grandfather     No Known Problems Paternal Grandmother     No Known Problems Paternal Grandfather      Past Surgical History:   Procedure Laterality Date    BREAST BIOPSY Right 03/23/2006    core benign    COLONOSCOPY      every 5 years    KNEE ARTHROSCOPY Left     NASAL SEPTUM SURGERY      RI COLONOSCOPY FLX DX W/COLLJ SPEC WHEN PFRMD N/A 10/18/2018    Procedure: COLONOSCOPY;  Surgeon: Dolores Velez DO;  Location: [facility] GI [facility];  Service: Gastroenterology    UPPER GASTROINTESTINAL ENDOSCOPY      VARICOSE VEIN SURGERY Right        Current Outpatient Medications:     ALPRAZolam (XANAX) 0.25 mg tablet, TK 1 T PO Q 8 H PRA, Disp: , Rfl: 0    Calcium Carb-Cholecalciferol (Calcium 600+D) 600-10 MG-MCG TABS, , Disp: , Rfl:     cholecalciferol (VITAMIN D3) 1,000 units tablet, Take 500 Units by mouth daily, Disp: , Rfl:     denosumab (Prolia) 60 mg/mL, Inject 60 mg under the skin once, Disp: , Rfl:     dicyclomine (BENTYL) 10 mg capsule, Take 1 capsule  "(10 mg total) by mouth every 6 (six) hours as needed (abd pain), Disp: 270 capsule, Rfl: 1    loperamide (IMODIUM A-D) 2 MG tablet, Take 1.5 tablets (3 mg total) by mouth 4 (four) times a day as needed for diarrhea, Disp: 360 tablet, Rfl: 3    magnesium 30 MG tablet, Take 30 mg by mouth daily , Disp: , Rfl:     Multiple Vitamins-Minerals (MULTIVITAMIN WITH MINERALS) tablet, Take 1 tablet by mouth daily, Disp: , Rfl:     nitrofurantoin (MACRODANTIN) 50 mg capsule, TAKE 1 CAPSULE BY MOUTH AFTER INTERCOURSE, Disp: 30 capsule, Rfl: 2    PreviDent 5000 Booster Plus 1.1 % PSTE, BRUSH NORMALLY AND EXPECTORATE BUT DO NOT RINSE AFTER. DO NOT EAT OR DRINK ANYTHING FOR AT LEAST 30 MINUTES AFTER USE, Disp: , Rfl:     rosuvastatin (CRESTOR) 5 mg tablet, TAKE 1 TABLET(5 MG) BY MOUTH DAILY, Disp: 90 tablet, Rfl: 3  Allergies   Allergen Reactions    Egg Phosphatides-Glycerin-Soybean Oil  [Fat Emulsion Plant Based (Soy)] Abdominal Pain    Eggs Or Egg-Derived Products - Food Allergy Abdominal Pain    Gluten Meal - Food Allergy Abdominal Pain    Lactose - Food Allergy Abdominal Pain    Azithromycin Other (See Comments)     Stomach cramping    Erythromycin Other (See Comments)    Erythromycin Base Other (See Comments)    Keflex [Cephalexin] Other (See Comments)     Severe stomach cramping    Milk-Related Compounds - Food Allergy        Vitals:    04/25/24 1448   BP: 96/62   BP Location: Left arm   Patient Position: Sitting   Cuff Size: Adult   Pulse: (!) 47   Temp: 98.3 °F (36.8 °C)   SpO2: 97%   Weight: 62.1 kg (137 lb)   Height: 5' 7\" (1.702 m)     Vitals:    04/25/24 1448   Weight: 62.1 kg (137 lb)      Height: 5' 7\" (170.2 cm)   Body mass index is 21.46 kg/m².    Physical Exam:  GEN: Pamela J Magnan appears well, alert and oriented x 3, pleasant and cooperative   HEENT: pupils equal, round, and reactive to light; extraocular muscles intact  NECK: supple, no carotid bruits   HEART: Bradycardic, Otherwise normal  LUNGS: clear to " auscultation bilaterally; no wheezes, rales, or rhonchi   ABDOMEN: normal bowel sounds, soft, no tenderness, no distention  EXTREMITIES: peripheral pulses normal; no clubbing, cyanosis, or edema  NEURO: no focal findings   SKIN: normal without suspicious lesions on exposed skin    ROS:  Positive for IBS, reflux  Except as noted in HPI, is otherwise reviewed in detail and a 12 point review of systems is negative.  ROS reviewed and is unchanged    Labs:  Lab Results   Component Value Date     04/28/2014    K 4.1 03/07/2024     03/07/2024    CREATININE 0.67 03/07/2024    BUN 20 03/07/2024    CO2 26 03/07/2024    ALT 19 10/19/2023    AST 19 10/19/2023    TSH 1.74 03/08/2023    GLUF 92 02/24/2021    HGBA1C 5.7 (H) 02/04/2022    WBC 5.31 02/24/2021    HGB 13.3 02/24/2021    HCT 42.0 02/24/2021     02/24/2021     Lab Results   Component Value Date    CHOL 210 04/21/2014    CHOL 204 03/03/2014     Lab Results   Component Value Date    HDL 63 03/07/2024    HDL 65 02/24/2021    HDL 58 04/30/2019     Lab Results   Component Value Date    LDLCALC 66 03/07/2024    LDLCALC 108 (H) 02/24/2021    LDLCALC 94 04/30/2019     Lab Results   Component Value Date    TRIG 122 03/07/2024    TRIG 139 02/24/2021    TRIG 184 (H) 04/30/2019     CT Calcium Score 3/2023:  Coronary artery calcium score breakdown is as follows:     Left main coronary artery:  0  Left anterior descending coronary artery and diagonal branches:  1  Left circumflex coronary artery and left marginal branches:  0  Right coronary artery and right marginal branches:  0  Posterior descending coronary artery: 0     TOTAL coronary calcium score:  1     Calcium score PERCENTILE of age, race, and gender matched database participants in the Multi-Ethnic Study of Atherosclerosis (FENTON) trial:   64     HEART/GREAT VESSELS: No significant low radiation dose noncontrast CT abnormality of the heart.  No thoracic aortic aneurysm.     EXTRACARDIAC FINDINGS: No  significant findings identified on limited small field of view low radiation dose noncontrast images of the chest at the level of the heart.  Thoracic dextroscoliosis noted.     IMPRESSION:    Vas Screen 6/2019  Impression  CAROTID SCREENING:  Evaluation reveals a normal internal carotid artery on the right.  Evaluation reveals a normal internal carotid artery on the left.  AORTA SCREENING:  The abdominal aorta is patent and normal in caliber with the greatest diameter  measurement of 1.73cms.  PAD SCREENING:  The ankle/brachial index on the right is 1.29 , which is within normal limits.  The ankle/brachial index on the left is  1.34, which is within normal limits.    Stress Test 5/28/19:  STRESS SUMMARY: Duration of exercise was 11 min and 0 sec. The patient exercised to protocol stage 4. Maximal work rate was 13.4 METs. Functional capacity was excellent. Maximal heart rate during stress was 169 bpm ( 103 % of maximal  predicted heart rate). The heart rate response to stress was normal. There was normal resting blood pressure with an appropriate response to stress. Pre 02 sat 100%. Post 02 sat 98%. The rate-pressure product for the peak heart rate and  blood pressure was 92710. There was no chest pain during stress. The stress test was terminated due to achievement of maximal (symptom limited) exercise and achievement of target heart rate. The stress ECG was negative for ischemia  although limited by artifact at peak exercise. There were no stress arrhythmias or conduction abnormalities.     SUMMARY:  -  Stress results: Duration of exercise was 11 min and 0 sec. Functional capacity was excellent. Target heart rate was achieved. There was no chest pain during stress.  -  ECG conclusions: The stress ECG was negative for ischemia although limited by artifact at peak exercise.     IMPRESSIONS: No evidence of ischemia after maximal exercise without reproduction of symptoms.    Holter:  Impression:   Overall borderline  hrs of holter monitoring.  Predominant rhythm was normal sinus rhythm.  Normal diurnal variation of heart rate.  Overall low density of supraventricular ectopy during the monitored period.  One short burst of atrial tachycardia was noted.  There was no ventricular ectopy during the monitored period.  Palpitations correlated with normal sinus rhythm or sinus tachycardia.    EKG:  Sinus bradycardia, 47 beats per minute.

## 2024-07-29 ENCOUNTER — TELEPHONE (OUTPATIENT)
Age: 64
End: 2024-07-29

## 2024-07-29 NOTE — TELEPHONE ENCOUNTER
"Behavioral Health Outpatient Intake Questions    Referred By   : PCP    Please advise interviewee that they need to answer all questions truthfully to allow for best care, and any misrepresentations of information may affect their ability to be seen at this clinic   => Was this discussed? Yes       Behavioral Health Outpatient Intake History -     Presenting Problem (in patient's own words):   Family Issues    Are there any communication barriers for this patient?     No                                                   Are you taking any psychiatric medications? Yes     If \"YES\" -What are they Xanax - PCP       Has the Patient previously received outpatient Talk Therapy or Medication Management from [facility]  No       Has the Patient abused alcohol or other substances in the last 6 months ? No          Legal History-     Is this treatment court ordered? No   If \"yes \"send to :  Talk Therapy : Send to Mir Vazquez/Myesha Carrington for final determination   Med Management: Send to Dr Ruiz for final determination     Has the Patient been convicted of a felony?  No   If \"Yes\" send to -When, What?  Talk Therapy: Send to Mir Carrington for final determination   Med Management: Send to Dr Ruiz for final determination     ACCEPTED as a patient Yes  If \"Yes\" Appointment Date: 10/30 with Patricia Ponce    Referred Elsewhere? No      Name of Insurance Co:[employer]  Insurance ID# [id number]   Insurance Phone #  If ins is primary or secondary? Primary    "

## 2024-08-05 ENCOUNTER — TELEPHONE (OUTPATIENT)
Dept: PSYCHIATRY | Facility: CLINIC | Age: 64
End: 2024-08-05

## 2024-08-05 NOTE — TELEPHONE ENCOUNTER
One week follow up call for New Patient appointment with   DAKOTAH Oakes   On 10/30/2024 was made on 08/05/2024. Writer informed patient of New Patient paperwork needing to be completed 5 days prior to the appointment. Writer confirmed paperwork has been sent via [employer].    Appointment was made on: 07/29/2024

## 2024-10-30 ENCOUNTER — OFFICE VISIT (OUTPATIENT)
Dept: BEHAVIORAL/MENTAL HEALTH CLINIC | Facility: CLINIC | Age: 64
End: 2024-10-30
Payer: COMMERCIAL

## 2024-10-30 DIAGNOSIS — F41.1 GENERALIZED ANXIETY DISORDER: Primary | ICD-10-CM

## 2024-10-30 PROCEDURE — 90791 PSYCH DIAGNOSTIC EVALUATION: CPT | Performed by: PSYCHIATRY & NEUROLOGY

## 2024-10-30 NOTE — PSYCH
" Behavioral Health Psychotherapy Assessment    Date of Initial Psychotherapy Assessment: 10/30/24  Referral Source: self  Has a release of information been signed for the referral source? NA    Preferred Name: Pamela J Magnan  Preferred Pronouns: She/her  YOB: 1960 Age: 64 y.o.  Sex assigned at birth: female   Gender Identity: female  Race:   Preferred Language: English    Emergency Contact:  Full Name: Michael Magnan  Relationship to Client: spouse  Contact information:   [phone]     Primary Care Physician:  Santo Jamison DO  [address]  [place] [address]  [phone]  Has a release of information been signed? No    Physical Health History:  Past surgical procedures: `  Do you have a history of any of the following: other none  Do you have any mobility issues? No    Relevant Family History:  none    Presenting Problem (What brings you in?)  Kelle shared that she has a few family stressors that bother her, the main one being her 92 year old mother who lives on [place] in her own home with a live-in caregiver.  She said that she is her mother's main caregiver, particularly since her brother that lives in [place] does not want to handle anything but the financial matters.  She noted that she goes out to see her mom every three weeks or so, but finds mom very difficult to deal with because she is depressed, \"curmudgeonly\" and is stubborn and any kind of change or asking for help.  Discussed how she can try to approach her mother or protect her own mental health in dealing with mom.  She shared that two of her brothers  within two weeks of each other about 5 years ago, along with her older brother's wife on the same day as her brother.  She said that this has been very traumatic for the whole family, and she still struggles to cope with her grief over their deaths, as it was so much in such a short period of time.  She said that they both had congenital " "heart issues, and now she is very worried that someone else close to her will die suddenly (she sees cardiology regularly, her kids also do as well, and her remaining brother also has had heart attacks and bypass surgeries).  She said that most days she fears that something bad is going to happen, and is in frequent contact with her three children to reassure herself that they are ok.  She said that she has some difficulty with sleep, falling asleep fine, but often waking up in the early morning hours with her mind racing and unable to return to sleep.  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  She does not need to work any longer, as they are financially sound, but she feels the need to work a bit more to find a way to end her work career in a more satisfactory way.      Mental Health Advance Directive:  Do you currently have a Mental Health Advance Directive?no    Diagnosis:   Diagnosis ICD-10-CM Associated Orders   1. Generalized anxiety disorder  F41.1           Initial Assessment:     Current Mental Status:    Appearance: appropriate, casual and neat      Behavior/Manner: cooperative and tearful      Affect/Mood:  Anxious    Speech:  Normal and articulate    Sleep:  Interrupted    Oriented to: oriented to self, oriented to place and oriented to time       Clinical Symptoms    Anxiety: yes      Anxiety Symptoms: excessive worry, nervous/anxious and difficulty controlling worry      Have you ever been assaultive to others or the environment: No      Have you ever been self-injurious: No      Counseling History:  Previous Counseling or Treatment  (Mental Health or Drug & Alcohol): Yes    Previous Counseling Details:  Counseling with [facility] about 4 years ago after deaths of 2 " brothers and a sister in law  Have you previously taken psychiatric medications: No      Suicide Risk Assessment  Have you ever had a suicide attempt: No    Have you had incidents of suicidal ideation: No    Are you currently experiencing suicidal thoughts: No      Substance Abuse/Addiction Assessment:  Alcohol: No    Heroin: No    Fentanyl: No    Opiates: No    Cocaine: No    Amphetamines: No    Hallucinogens: No    Club Drugs: No    Benzodiazepines: No    Other Rx Meds: No    Marijuana: No    Tobacco/Nicotine: No    Have you experienced blackouts as a result of substance use: No    Have you had any periods of abstinence: No    Have you experienced symptoms of withdrawal: No    Have you ever overdosed on any substances?: No    Are you currently using any Medication Assisted Treatment for Substance Use: No      Disordered Eating History:  Do you have a history of disordered eating: No      Social Determinants of Health:    SDOH:  None    Trauma and Abuse History:    Have you ever been abused: No      Legal History:    Have you ever been arrested  or had a DUI: No      Have you been incarcerated: No      Are you currently on parole/probation: No      Any current Children and Youth involvement: No      Any pending legal charges: No      Relationship History:    Current marital status:       Natural Supports:  Friends and other    Relationship History:  Spouse and children    Employment History    Are you currently employed: Yes      Longest period of employment:  Decades    Employer/ Job title:  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[employer]; former mobile therapist, special  and supervisor    Future work goals:  Will retire shortly    Sources of income/financial support:  Work and family members     History:      Status: no history of  duty  Educational History:     Have you ever been diagnosed with a learning disability: No      Highest level of education:  Master's Degree    Have you  ever had an IEP or 504-plan: No      Do you need assistance with reading or writing: No      Recommended Treatment:     Psychotherapy:  Individual sessions    Frequency:  2 times    Session frequency:  Monthly      Visit start and stop times:    10/30/24  Start Time: 1300  Stop Time: 1403  Total Visit Time: 63 minutes

## 2024-11-11 ENCOUNTER — TELEPHONE (OUTPATIENT)
Dept: PSYCHIATRY | Facility: CLINIC | Age: 64
End: 2024-11-11

## 2024-11-22 DIAGNOSIS — R93.1 ELEVATED CORONARY ARTERY CALCIUM SCORE: ICD-10-CM

## 2024-11-22 RX ORDER — ROSUVASTATIN CALCIUM 5 MG/1
5 TABLET, COATED ORAL DAILY
Qty: 90 TABLET | Refills: 1 | Status: SHIPPED | OUTPATIENT
Start: 2024-11-22

## 2024-11-22 NOTE — TELEPHONE ENCOUNTER
Needs new prescription sent     Reason for call:   [x] Refill   [] Prior Auth  [] Other:     Office:   [] PCP/Provider -   [x] Specialty/Provider - Kirk Jara    Medication: Rosuvastatin     Dose/Frequency: 5 mg Daily     Quantity: 90    Pharmacy: [facility] [place]     Does the patient have enough for 3 days?   [] Yes   [x] No - Send as HP to POD

## 2024-11-25 ENCOUNTER — TELEPHONE (OUTPATIENT)
Dept: PSYCHIATRY | Facility: CLINIC | Age: 64
End: 2024-11-25

## 2024-11-25 NOTE — TELEPHONE ENCOUNTER
PT came to [facility] to check in for appt but writer pulled up and the appt was cxl right after being made. PT was first started with another provider but did not have any availability until 6m after NP appt and needed to be transferred to someone new. Support team reached out to PT and LVM for the 11/25/24 appt but PT never called back so it was cxl. Writer was able to r/s 12/5/24. PT was wondering if the new provider could see the notes from the 10/30/24 appt. Writer explained they are able but as this is a new provider they will be asking a lot of the same questions as we can't look into prior encounters. PT understood.

## 2025-03-03 ENCOUNTER — TELEPHONE (OUTPATIENT)
Age: 65
End: 2025-03-03

## 2025-03-03 DIAGNOSIS — Z12.31 ENCOUNTER FOR SCREENING MAMMOGRAM FOR BREAST CANCER: Primary | ICD-10-CM

## 2025-03-03 NOTE — TELEPHONE ENCOUNTER
Patient had called asking for a mammogram order to be placed in her chart. Patient can be reached at [phone] once order is placed.

## 2025-03-03 NOTE — TELEPHONE ENCOUNTER
Called patient, mammogram ordered. Last mammogram was 3/22/2024. Has upcoming yearly on 4/21/2025.

## 2025-04-21 ENCOUNTER — ANNUAL EXAM (OUTPATIENT)
Dept: OBGYN CLINIC | Facility: CLINIC | Age: 65
End: 2025-04-21
Payer: COMMERCIAL

## 2025-04-21 VITALS — WEIGHT: 129 LBS | DIASTOLIC BLOOD PRESSURE: 76 MMHG | BODY MASS INDEX: 20.2 KG/M2 | SYSTOLIC BLOOD PRESSURE: 116 MMHG

## 2025-04-21 DIAGNOSIS — R92.30 DENSE BREASTS: Primary | ICD-10-CM

## 2025-04-21 DIAGNOSIS — Z01.419 WOMEN'S ANNUAL ROUTINE GYNECOLOGICAL EXAMINATION: ICD-10-CM

## 2025-04-21 PROCEDURE — S0612 ANNUAL GYNECOLOGICAL EXAMINA: HCPCS | Performed by: OBSTETRICS & GYNECOLOGY

## 2025-04-21 NOTE — PATIENT INSTRUCTIONS
Patient was informed of a stable menopausal GYN examination.  There was evidence of a slight cystocele which is asymptomatic.  No problem bladder control.  Will watch that.  She has a right dense breast on recent mammogram we will order an ABUS of the right breast.  Her next colonoscopy is due in 2028.  She will continue to care of her medical team.  She should return to my office in 1 year.  A Pap smear was done today.  There is no problem depression.  Her PQ PHQ score was a total of 1 for sometimes having problems staying asleep.  She should return to my office in 1 year.

## 2025-04-21 NOTE — PROGRESS NOTES
Name: Pamela J Magnan      : 1960      MRN: [id number]  Encounter Provider: Josemanuel Estrada MD  Encounter Date: 2025   Encounter department: [facility]  :  Assessment & Plan  Women's annual routine gynecological examination  The patient was informed of a stable menopausal GYN examination.  She is on Prolia for her osteoporosis she has had some improvement in the osteoporosis.  She still using the Crestor.  Still using hide Bentyl for IBS.  He is somewhat stressed and taking care of elderly mother but doing well.  She will continue getting her mammograms.  Her colonoscopy is due in .  Her recent mammogram showed dense breasts of the right breast we will order a ABUS.  Orders:    Thinprep Tis Pap Reflex HPV mRNA E6/E7    Dense breasts             History of Present Illness   HPI  Pamela J Magnan is a 64 y.o. female who presents for yearly exam.  She is  3 para 3 with 3 prior vaginal deliveries.  She is menopausal.  She will need a Pap smear today.  She has a history of osteoporosis, IBS, abnormal lipids and anxiety.  She is having issues with taking care of her elderly mother who is showing signs of early dementia.  She feels safe at home.  She still sexually active.  She is content with her weight.  She sees a dentist on a regular basis.  Her colonoscopies are up-to-date her next colonoscopy should probably be done in  she will check with her primary care physician with that.  No major  or GI complaint.  She still uses Macrobid 10 after intercourse.  Recent mammogram showed dense breast on the right breast and to have occasional discomfort.  Will order a ABUS of the right breast.      Review of Systems   All other systems reviewed and are negative.         Objective   /76   Wt 58.5 kg (129 lb)   BMI 20.20 kg/m²      Physical Exam  Vitals reviewed. Exam conducted with a chaperone present.   Constitutional:       Appearance: Normal appearance. She is normal weight.    HENT:      Head: Normocephalic and atraumatic.      Mouth/Throat:      Mouth: Mucous membranes are moist.   Cardiovascular:      Rate and Rhythm: Normal rate and regular rhythm.      Pulses: Normal pulses.   Pulmonary:      Effort: Pulmonary effort is normal.      Breath sounds: Normal breath sounds.   Chest:   Breasts:     Breasts are symmetrical.      Right: Normal.      Left: Normal.   Abdominal:      General: Abdomen is flat. Bowel sounds are increased.      Palpations: Abdomen is soft. There is no hepatomegaly or splenomegaly.      Tenderness: There is no abdominal tenderness.      Hernia: No hernia is present. There is no hernia in the umbilical area, ventral area, left inguinal area or right inguinal area.   Genitourinary:     General: Normal vulva.      Pubic Area: No rash or pubic lice.       Labia:         Right: No rash, tenderness, lesion or injury.         Left: No rash, tenderness, lesion or injury.       Urethra: No prolapse or urethral pain.      Vagina: Normal. No signs of injury and foreign body. No vaginal discharge, erythema, tenderness, bleeding, lesions or prolapsed vaginal walls.      Cervix: Normal.      Uterus: Normal.       Rectum: Normal.      Comments: The external genitalia normal limits the vagina is clean the cervix parous with close uterus is retroverted normal size adnexa clear bilaterally.  There is no cervical motion tenderness.  A Pap smear was performed today.  The urethra and bladder normal working relationship.  Musculoskeletal:         General: Normal range of motion.      Cervical back: Normal range of motion and neck supple.   Skin:     General: Skin is warm.   Neurological:      General: No focal deficit present.      Mental Status: She is alert and oriented to person, place, and time.   Psychiatric:         Mood and Affect: Mood normal.         Behavior: Behavior normal.         Thought Content: Thought content normal.

## 2025-04-29 LAB
CLINICAL INFO: NORMAL
CYTO CVX: NORMAL
CYTOLOGY CMNT CVX/VAG CYTO-IMP: NORMAL
DATE PREVIOUS BX: NORMAL
LMP START DATE: NORMAL
SL AMB PREV. PAP:: NORMAL
SPECIMEN SOURCE CVX/VAG CYTO: NORMAL

## 2025-05-19 ENCOUNTER — TELEPHONE (OUTPATIENT)
Age: 65
End: 2025-05-19

## 2025-05-19 NOTE — TELEPHONE ENCOUNTER
Patient called, scheduled at [facility]  [facility] 5/21 for imaging.     Please fax the US breast right limited (diagnostic) (Order [id number]) script to:  (f) [phone]

## 2025-05-21 DIAGNOSIS — R93.1 ELEVATED CORONARY ARTERY CALCIUM SCORE: ICD-10-CM

## 2025-05-22 RX ORDER — ROSUVASTATIN CALCIUM 5 MG/1
5 TABLET, COATED ORAL DAILY
Qty: 90 TABLET | Refills: 3 | Status: SHIPPED | OUTPATIENT
Start: 2025-05-22

## 2025-08-21 ENCOUNTER — OFFICE VISIT (OUTPATIENT)
Dept: CARDIOLOGY CLINIC | Facility: CLINIC | Age: 65
End: 2025-08-21
Payer: COMMERCIAL

## 2025-08-21 VITALS
HEART RATE: 53 BPM | OXYGEN SATURATION: 99 % | SYSTOLIC BLOOD PRESSURE: 104 MMHG | BODY MASS INDEX: 19.89 KG/M2 | WEIGHT: 127 LBS | DIASTOLIC BLOOD PRESSURE: 66 MMHG

## 2025-08-21 DIAGNOSIS — E78.5 DYSLIPIDEMIA: ICD-10-CM

## 2025-08-21 DIAGNOSIS — Z82.49 FAMILY HISTORY OF PREMATURE CAD: Primary | ICD-10-CM

## 2025-08-21 DIAGNOSIS — R93.1 ELEVATED CORONARY ARTERY CALCIUM SCORE: ICD-10-CM

## 2025-08-21 DIAGNOSIS — R00.1 SINUS BRADYCARDIA: ICD-10-CM

## 2025-08-21 DIAGNOSIS — I25.10 CORONARY ARTERY CALCIFICATION SEEN ON CAT SCAN: ICD-10-CM

## 2025-08-21 PROCEDURE — 99214 OFFICE O/P EST MOD 30 MIN: CPT | Performed by: INTERNAL MEDICINE

## 2025-08-21 PROCEDURE — 93000 ELECTROCARDIOGRAM COMPLETE: CPT | Performed by: INTERNAL MEDICINE

## 2025-08-21 RX ORDER — ROSUVASTATIN CALCIUM 5 MG/1
TABLET, COATED ORAL
Start: 2025-08-25